# Patient Record
Sex: MALE | Race: BLACK OR AFRICAN AMERICAN | Employment: FULL TIME | URBAN - METROPOLITAN AREA
[De-identification: names, ages, dates, MRNs, and addresses within clinical notes are randomized per-mention and may not be internally consistent; named-entity substitution may affect disease eponyms.]

---

## 2017-01-11 ENCOUNTER — OFFICE VISIT (OUTPATIENT)
Dept: INTERNAL MEDICINE CLINIC | Age: 52
End: 2017-01-11

## 2017-01-11 VITALS
WEIGHT: 187 LBS | DIASTOLIC BLOOD PRESSURE: 78 MMHG | HEART RATE: 70 BPM | BODY MASS INDEX: 26.18 KG/M2 | RESPIRATION RATE: 15 BRPM | TEMPERATURE: 98.3 F | SYSTOLIC BLOOD PRESSURE: 110 MMHG | HEIGHT: 71 IN | OXYGEN SATURATION: 98 %

## 2017-01-11 DIAGNOSIS — F51.01 PRIMARY INSOMNIA: ICD-10-CM

## 2017-01-11 DIAGNOSIS — R05.3 COUGH, PERSISTENT: ICD-10-CM

## 2017-01-11 DIAGNOSIS — J01.00 SUBACUTE MAXILLARY SINUSITIS: Primary | ICD-10-CM

## 2017-01-11 DIAGNOSIS — H61.23 BILATERAL IMPACTED CERUMEN: ICD-10-CM

## 2017-01-11 DIAGNOSIS — F98.8 ADD (ATTENTION DEFICIT DISORDER): ICD-10-CM

## 2017-01-11 DIAGNOSIS — M79.10 MYALGIA: ICD-10-CM

## 2017-01-11 RX ORDER — MOMETASONE FUROATE 50 UG/1
2 SPRAY, METERED NASAL DAILY
Qty: 1 CONTAINER | Refills: 0 | Status: SHIPPED | OUTPATIENT
Start: 2017-01-11 | End: 2017-01-21

## 2017-01-11 RX ORDER — TRAZODONE HYDROCHLORIDE 50 MG/1
50 TABLET ORAL
Qty: 90 TAB | Refills: 0 | Status: SHIPPED | OUTPATIENT
Start: 2017-01-11 | End: 2017-04-11

## 2017-01-11 RX ORDER — DEXTROAMPHETAMINE SACCHARATE, AMPHETAMINE ASPARTATE, DEXTROAMPHETAMINE SULFATE AND AMPHETAMINE SULFATE 5; 5; 5; 5 MG/1; MG/1; MG/1; MG/1
20 TABLET ORAL 2 TIMES DAILY
Qty: 60 TAB | Refills: 0 | Status: SHIPPED | OUTPATIENT
Start: 2017-01-11 | End: 2017-04-25 | Stop reason: SDUPTHER

## 2017-01-11 RX ORDER — CODEINE PHOSPHATE AND GUAIFENESIN 10; 100 MG/5ML; MG/5ML
10 SOLUTION ORAL
Qty: 180 ML | Refills: 0 | Status: SHIPPED | OUTPATIENT
Start: 2017-01-11 | End: 2017-01-16

## 2017-01-11 NOTE — MR AVS SNAPSHOT
Visit Information Date & Time Provider Department Dept. Phone Encounter #  
 1/11/2017  2:45 PM Rey Cortes MD [facility] [phone] [id number] Upcoming Health Maintenance Date Due COLONOSCOPY 7/30/2025 DTaP/Tdap/Td series (2 - Td) 9/28/2026 Allergies as of 1/11/2017  Review Complete On: 1/11/2017 By: Christal Mcdaniel LPN No Known Allergies Current Immunizations  Reviewed on 9/28/2016 Name Date H1N1 FLU VACCINE 4/8/2010 Influenza Vaccine 11/9/2016, 11/10/2014 12:30 PM, 10/21/2013  3:11 PM  
 Influenza Vaccine Intradermal PF 11/4/2015 Influenza Vaccine Split 11/24/2010, 9/28/2009 Tdap 9/28/2016 Not reviewed this visit You Were Diagnosed With   
  
 Codes Comments Subacute maxillary sinusitis    -  Primary ICD-10-CM: J01.00 ICD-9-CM: 461.0 Cough, persistent     ICD-10-CM: R05 ICD-9-CM: 786.2 Bilateral impacted cerumen     ICD-10-CM: H61.23 
ICD-9-CM: 380.4 Myalgia     ICD-10-CM: M79.1 ICD-9-CM: 729.1 ADD (attention deficit disorder)     ICD-10-CM: F98.8 ICD-9-CM: 314.00 Primary insomnia     ICD-10-CM: F51.01 
ICD-9-CM: 307.42 Vitals BP Pulse Temp Resp Height(growth percentile) Weight(growth percentile) 110/78 (BP 1 Location: Right arm, BP Patient Position: Sitting) 70 98.3 °F (36.8 °C) (Oral) 15 5' 11\" (1.803 m) 187 lb (84.8 kg) SpO2 BMI Smoking Status 98% 26.08 kg/m2 Never Smoker BMI and BSA Data Body Mass Index Body Surface Area 26.08 kg/m 2 2.06 m 2 [phone] Your Updated Medication List  
  
   
This list is accurate as of: 1/11/17  4:33 PM.  Always use your most recent med list.  
  
  
  
  
 dextroamphetamine-amphetamine 20 mg tablet Commonly known as:  ADDERALL Take 1 Tab (20 mg total) by mouth two (2) times a day. Max Daily Amount: 40 mg  
  
 guaiFENesin-codeine 100-10 mg/5 mL solution Commonly known as:  ROBITUSSIN AC Take 10 mL by mouth two (2) times daily as needed for Cough for up to 5 days. Max Daily Amount: 20 mL. mometasone 50 mcg/actuation nasal spray Commonly known as:  NASONEX  
2 Sprays by Both Nostrils route daily for 10 days. traZODone 50 mg tablet Commonly known as:  Mick Fragatito Take 1 Tab by mouth nightly for 90 days. Prescriptions Printed Refills  
 mometasone (NASONEX) 50 mcg/actuation nasal spray 0 Si Sprays by Both Nostrils route daily for 10 days. Class: Print Route: Both Nostrils  
 guaiFENesin-codeine (ROBITUSSIN AC) 100-10 mg/5 mL solution 0 Sig: Take 10 mL by mouth two (2) times daily as needed for Cough for up to 5 days. Max Daily Amount: 20 mL. Class: Print Route: Oral  
 dextroamphetamine-amphetamine (ADDERALL) 20 mg tablet 0 Sig: Take 1 Tab (20 mg total) by mouth two (2) times a day. Max Daily Amount: 40 mg  
 Class: Print Route: Oral  
 traZODone (DESYREL) 50 mg tablet 0 Sig: Take 1 Tab by mouth nightly for 90 days. Class: Print Route: Oral  
  
We Performed the Following REMOVE IMPACTED EAR WAX [36384 CPT(R)] Introducing [address]! Dear Luisa Fermin: Thank you for requesting a [facility] account. Our records indicate that you already have an active [facility] account. You can access your account anytime at [email or web address] Did you know that you can access your hospital and ER discharge instructions at any time in [facility]? You can also review all of your test results from your hospital stay or ER visit. Additional Information If you have questions, please visit the Frequently Asked Questions section of the [employer] website at [email or web address] Remember, MyChart is NOT to be used for urgent needs. For medical emergencies, dial 911. Now available from your iPhone and Android! Please provide this summary of care documentation to your next provider. Your primary care clinician is listed as Miguel Ángel Meng. If you have any questions after today's visit, please call [phone].

## 2017-01-11 NOTE — PROGRESS NOTES
Written by Nori Weller, as dictated by Dr. Adam Zhao MD.    Alina Da Silva is a 46 y.o. male. HPI  The patient comes in today C/O a cough x 1 week with a sputum. He has body aches and a headache as well. He has not used any nasal spray. He cannot sleep at night due to the coughing. No fever or chills. He has been having fullness in both ears. He needs a refill of his adderall which I last refilled on 11/09 and he only takes the medication as needed. He also needs a refill of trazodone for sleep. Patient Active Problem List   Diagnosis Code    Attention deficit hyperactivity disorder (ADHD) F90.9    Depression F32.9    Insomnia G47.00    Adjustment disorder with mixed anxiety and depressed mood F43.23    Attention deficit disorder without mention of hyperactivity F98.8    S/P left inguinal hernia repair Z98.890, Z87.19        No current outpatient prescriptions on file prior to visit. No current facility-administered medications on file prior to visit. No Known Allergies    Past Medical History   Diagnosis Date    ADHD 9/28/2009    Depression 9/28/2009    Left inguinal hernia 9/22/2015    S/P left inguinal hernia repair 1/22/2016       Past Surgical History   Procedure Laterality Date    Hx other surgical  2010     QUAD TENDON RE-ATTACHED    Hx mohs procedure  2010    Hx heent  2012 2010     WISDOM TEETH REMOVED    Hx hernia repair Left 10/23/15     Inguinal w/mesh by Dr. Sunil Rabago       Family History   Problem Relation Age of Onset    Cancer Mother      Stomach Cancer diagnosed age 68    Cancer Maternal Grandfather     Hypertension Paternal Grandmother     Glaucoma Paternal Grandfather     Arthritis-osteo Father     Anesth Problems Neg Hx        Social History     Social History    Marital status:      Spouse name: N/A    Number of children: N/A    Years of education: N/A     Occupational History    Not on file. Social History Main Topics    Smoking status: Never Smoker    Smokeless tobacco: Never Used    Alcohol use No    Drug use: No    Sexual activity: Yes     Partners: Female     Other Topics Concern    Not on file     Social History Narrative           Review of Systems   Constitutional: Negative for malaise/fatigue. HENT: Negative for congestion. Respiratory: Positive for cough and sputum production. Negative for wheezing. Cardiovascular: Negative for chest pain and palpitations. Gastrointestinal: Negative for abdominal pain and heartburn. Musculoskeletal: Negative for joint pain and myalgias. Neurological: Positive for headaches. Negative for weakness. Visit Vitals    /78 (BP 1 Location: Right arm, BP Patient Position: Sitting)    Pulse 70    Temp 98.3 °F (36.8 °C) (Oral)    Resp 15    Ht 5' 11\" (1.803 m)    Wt 187 lb (84.8 kg)    SpO2 98%    BMI 26.08 kg/m2     Physical Exam   Constitutional: He is oriented to person, place, and time. He appears well-nourished. No distress. HENT:   Right Ear: External ear normal.   Left Ear: External ear normal.   Mouth/Throat: Oropharynx is clear and moist.   BL cerumen impaction   Maxillary sinus tenderness   Eyes: Conjunctivae and EOM are normal. Right eye exhibits no discharge. Left eye exhibits no discharge. Neck: Normal range of motion. Neck supple. Cardiovascular: Normal rate and regular rhythm. Pulmonary/Chest: Effort normal and breath sounds normal. He has no wheezes. Abdominal: Soft. Bowel sounds are normal. He exhibits no distension. Lymphadenopathy:     He has no cervical adenopathy. Neurological: He is alert and oriented to person, place, and time. Psychiatric: He has a normal mood and affect. Nursing note and vitals reviewed. ASSESSMENT and PLAN    ICD-10-CM ICD-9-CM    1.  Subacute maxillary sinusitis J01.00 461.0 mometasone (NASONEX) 50 mcg/actuation nasal spray script given to patient    I will prescribe him a nasal spray to help dry up his secretions. I am going to write him an abx prescription in case the sxs get worse. 2. Cough, persistent R05 786.2 guaiFENesin-codeine (ROBITUSSIN AC) 100-10 mg/5 mL solution script given to patient     I will prescribe him Robitussin with codeine in order to help his cough so that he can sleep at night       3. Bilateral impacted cerumen H61.23 380.4 REMOVE IMPACTED EAR WAX    Pt tolerated this procedure well. Time out: Immediately prior to procedure a \"time out\" was called to verify the correct patient, procedure, equipment, support staff and site/side marked as required. 4. Myalgia M79.1 729.1 During the day time I suggest that he take 600 mg of ibuprofen with breakfast in order to help with his aches and pains. 5. ADD (attention deficit disorder) F98.8 314.00 dextroamphetamine-amphetamine (ADDERALL) 20 mg tablet script given to patient. I discussed that since I have his testing on file  I will refill his medication as it has been 2 months since his last refill. 6. Primary insomnia F51.01 307.42 traZODone (DESYREL) 50 mg tablet script given to patient. This plan was reviewed with the patient and patient agrees. All questions were answered. This scribe documentation was reviewed by me and accurately reflects the examination and decisions made by me. This note will not be viewable in [address] Kristy Ceja

## 2017-01-11 NOTE — PROGRESS NOTES
Chief Complaint   Patient presents with    Cold Symptoms     with cough with green mucus and this has been lasting for the past two weeks.      Medication Refill     adderall and trazodone

## 2017-04-25 DIAGNOSIS — F98.8 ADD (ATTENTION DEFICIT DISORDER): ICD-10-CM

## 2017-04-26 RX ORDER — DEXTROAMPHETAMINE SACCHARATE, AMPHETAMINE ASPARTATE, DEXTROAMPHETAMINE SULFATE AND AMPHETAMINE SULFATE 5; 5; 5; 5 MG/1; MG/1; MG/1; MG/1
20 TABLET ORAL 2 TIMES DAILY
Qty: 60 TAB | Refills: 0 | Status: SHIPPED | OUTPATIENT
Start: 2017-04-26 | End: 2019-09-17 | Stop reason: ALTCHOICE

## 2019-03-08 ENCOUNTER — OFFICE VISIT (OUTPATIENT)
Dept: PRIMARY CARE CLINIC | Age: 54
End: 2019-03-08

## 2019-03-08 VITALS
OXYGEN SATURATION: 100 % | RESPIRATION RATE: 17 BRPM | DIASTOLIC BLOOD PRESSURE: 73 MMHG | WEIGHT: 193 LBS | HEIGHT: 71 IN | HEART RATE: 63 BPM | BODY MASS INDEX: 27.02 KG/M2 | SYSTOLIC BLOOD PRESSURE: 111 MMHG | TEMPERATURE: 98 F

## 2019-03-08 DIAGNOSIS — M25.512 ACUTE PAIN OF LEFT SHOULDER: Primary | ICD-10-CM

## 2019-03-08 RX ORDER — HYDROCORTISONE 25 MG/G
CREAM TOPICAL
Refills: 2 | COMMUNITY
Start: 2018-12-08 | End: 2019-09-17 | Stop reason: SDUPTHER

## 2019-03-08 RX ORDER — IBUPROFEN 800 MG/1
800 TABLET ORAL
Qty: 30 TAB | Refills: 1 | Status: SHIPPED | OUTPATIENT
Start: 2019-03-08 | End: 2020-05-01 | Stop reason: ALTCHOICE

## 2019-03-08 RX ORDER — IBUPROFEN 800 MG/1
TABLET ORAL
Refills: 3 | COMMUNITY
Start: 2019-02-19 | End: 2019-03-08 | Stop reason: SDUPTHER

## 2019-03-08 RX ORDER — TRETIONIN 0.1 MG/G
GEL TOPICAL
Refills: 12 | COMMUNITY
Start: 2019-01-13 | End: 2019-09-17 | Stop reason: SDUPTHER

## 2019-03-08 RX ORDER — CLINDAMYCIN PHOSPHATE 10 MG/G
GEL TOPICAL
Refills: 12 | COMMUNITY
Start: 2019-02-13 | End: 2019-09-17 | Stop reason: SDUPTHER

## 2019-03-08 NOTE — PROGRESS NOTES
Brijesh Raphael is a 52 y.o.  male and presents with     Chief Complaint   Patient presents with    Shoulder Pain     left shoulder pain x over 2 weeks. States that it came after lifting weights, nothing new.  Letter     wants a letter     Fatigue       Pt has been having pain in his left shoulder for about 2 weeks. He rates the pain at 6/10. Pt  Exercises and lifts weights. He says it hurts when he picks something or lays on his side. Pt has not been taking anything for pain. Pt also feels tired for 2 days. Has scratchy throat. No fever . Mild chills. No SOB, cough abdominal pain, urinary symptoms. Past Medical History:   Diagnosis Date    ADHD 9/28/2009    Depression 9/28/2009    Left inguinal hernia 9/22/2015    S/P left inguinal hernia repair 1/22/2016     Past Surgical History:   Procedure Laterality Date    HX HEENT  2012 2010    WISDOM TEETH REMOVED    HX HERNIA REPAIR Left 10/23/15    Inguinal w/mesh by Dr. Cris Davis Right 12/2017    HX MOHS PROCEDURES  2010    HX OTHER SURGICAL  2010    QUAD TENDON RE-ATTACHED     Current Outpatient Medications   Medication Sig    tretinoin (RETIN-A) 0.01 % topical gel APPLY GEL TO THE FACE FOR ACNE AT BEDTIME    clindamycin (CLINDAGEL) 1 % topical gel APPLY TO ACNE ON FACE DAILY (GENERIC FOR CLINDAGEL)    hydrocortisone (HYTONE) 2.5 % topical cream APPLY TO THE FACE FOR ACNE DAILY    ibuprofen (MOTRIN) 800 mg tablet Take 1 Tab by mouth every eight (8) hours as needed for Pain.  dextroamphetamine-amphetamine (ADDERALL) 20 mg tablet Take 1 Tab (20 mg total) by mouth two (2) times a dayEarliest Fill Date: 4/26/17. Max Daily Amount: 40 mg     No current facility-administered medications for this visit.       Health Maintenance   Topic Date Due    Shingrix Vaccine Age 49> (1 of 2) 07/26/2013    Influenza Age 5 to Adult  08/01/2018    COLONOSCOPY  07/30/2025    DTaP/Tdap/Td series (2 - Td) 09/28/2026    Hepatitis C Screening Completed     Immunization History   Administered Date(s) Administered    H1N1 Influenza Virus Vaccine 04/08/2010    Influenza Vaccine 10/21/2013, 11/10/2014, 11/09/2016, 10/08/2018    Influenza Vaccine Intradermal PF 11/04/2015    Influenza Vaccine Split 09/28/2009, 11/24/2010    Tdap 09/28/2016     No LMP for male patient. Allergies and Intolerances:   No Known Allergies    Family History:   Family History   Problem Relation Age of Onset    Cancer Mother         Stomach Cancer diagnosed age 68    Cancer Maternal Grandfather     Hypertension Paternal Grandmother     Glaucoma Paternal Grandfather     Arthritis-osteo Father     Anesth Problems Neg Hx        Social History:   He  reports that  has never smoked. he has never used smokeless tobacco.  He  reports that he does not drink alcohol.             Review of Systems:   General: negative for - chills, fatigue, fever, weight change  Psych: negative for - anxiety, depression, irritability or mood swings  ENT: negative for - headaches, hearing change, nasal congestion, oral lesions, sneezing or sore throat  Heme/ Lymph: negative for - bleeding problems, bruising, pallor or swollen lymph nodes  Endo: negative for - hot flashes, polydipsia/polyuria or temperature intolerance  Resp: negative for - cough, shortness of breath or wheezing  CV: negative for - chest pain, edema or palpitations  GI: negative for - abdominal pain, change in bowel habits, constipation, diarrhea or nausea/vomiting  : negative for - dysuria, hematuria, incontinence, pelvic pain or vulvar/vaginal symptoms  MSK: negative for - joint pain, joint swelling or muscle pain, pos for left shoulder pain  Neuro: negative for - confusion, headaches, seizures or weakness  Derm: negative for - dry skin, hair changes, rash or skin lesion changes          Physical:   Vitals:   Vitals:    03/08/19 0817   BP: 111/73   Pulse: 63   Resp: 17   Temp: 98 °F (36.7 °C)   TempSrc: Oral   SpO2: 100% Weight: 193 lb (87.5 kg)   Height: 5' 11\" (1.803 m)           Exam:   HEENT- atraumatic,normocephalic, awake, oriented, well nourished, rt ear wax  Neck - supple,no enlarged lymph nodes, no JVD, no thyromegaly  Chest- CTA, no rhonchi, no crackles  Heart- rrr, no murmurs / gallop/rub  Abdomen- soft,BS+,NT, no hepatosplenomegaly  Ext - no c/c/edema ,mild pain elicited on ROM at the left shoulder  Neuro- no focal deficits. Power 5/5 all extremities  Skin - warm,dry, no obvious rashes. Review of Data:   LABS:   Lab Results   Component Value Date/Time    WBC 5.8 09/28/2016 10:28 AM    HGB 13.0 09/28/2016 10:28 AM    HCT 41.1 09/28/2016 10:28 AM    PLATELET 975 28/25/5748 10:28 AM     Lab Results   Component Value Date/Time    Sodium 143 09/28/2016 10:28 AM    Potassium 4.3 09/28/2016 10:28 AM    Chloride 104 09/28/2016 10:28 AM    CO2 26 09/28/2016 10:28 AM    Glucose 84 09/28/2016 10:28 AM    BUN 12 09/28/2016 10:28 AM    Creatinine 1.00 09/28/2016 10:28 AM     Lab Results   Component Value Date/Time    Cholesterol, total 191 09/28/2016 10:28 AM    HDL Cholesterol 50 09/28/2016 10:28 AM    LDL, calculated 110 (H) 09/28/2016 10:28 AM    Triglyceride 155 (H) 09/28/2016 10:28 AM     No results found for: GPT        Impression / Plan:        ICD-10-CM ICD-9-CM    1. Acute pain of left shoulder M25.512 719.41 XR SHOULDER LT AP/LAT MIN 2 V      ibuprofen (MOTRIN) 800 mg tablet     Offered ordering labs , pt wants to wait till he sees PCP. Fatigue - clinically looks fine,declined work up for now    Avoid lifting heavy objects. May need MRI left shoulder if Xrays are unrevealing and pt continues to be in pain. May also need to see Orthopedics. Explained to patient risk benefits of the medications. Advised patient to stop meds if having any side effects. Pt verbalized understanding of the instructions. I have discussed the diagnosis with the patient and the intended plan as seen in the above orders.   The patient has received an after-visit summary and questions were answered concerning future plans. I have discussed medication side effects and warnings with the patient as well. I have reviewed the plan of care with the patient, accepted their input and they are in agreement with the treatment goals. Reviewed plan of care. Patient has provided input and agrees with goals.     Follow-up Disposition: Not on Shira Marrero MD

## 2019-03-08 NOTE — LETTER
3/8/2019 8:43 AM 
 
Mr. Zeinab Villarreal [address] [facility] [id number] To Whom It May Concern: 
 
Zeinab Villarreal is currently under the care of Will Hamilton. This is to state that the above patient has left shoulder  injury that occurred  doing exercise that involved lifting weights. If there are questions or concerns please have the patient contact our office.  
 
 
 
Sincerely, 
 
 
Purnima Polanco MD

## 2019-04-17 ENCOUNTER — OFFICE VISIT (OUTPATIENT)
Dept: PRIMARY CARE CLINIC | Age: 54
End: 2019-04-17

## 2019-04-17 VITALS
RESPIRATION RATE: 16 BRPM | TEMPERATURE: 97.8 F | DIASTOLIC BLOOD PRESSURE: 74 MMHG | BODY MASS INDEX: 25.76 KG/M2 | WEIGHT: 184 LBS | OXYGEN SATURATION: 100 % | HEIGHT: 71 IN | HEART RATE: 60 BPM | SYSTOLIC BLOOD PRESSURE: 111 MMHG

## 2019-04-17 DIAGNOSIS — Z00.00 PHYSICAL EXAM: Primary | ICD-10-CM

## 2019-04-17 DIAGNOSIS — G89.29 CHRONIC LEFT SHOULDER PAIN: ICD-10-CM

## 2019-04-17 DIAGNOSIS — F90.2 ATTENTION DEFICIT HYPERACTIVITY DISORDER (ADHD), COMBINED TYPE: ICD-10-CM

## 2019-04-17 DIAGNOSIS — M25.512 CHRONIC LEFT SHOULDER PAIN: ICD-10-CM

## 2019-04-17 DIAGNOSIS — F51.01 PRIMARY INSOMNIA: ICD-10-CM

## 2019-04-17 RX ORDER — CLINDAMYCIN PHOSPHATE 10 MG/G
GEL TOPICAL
Qty: 1 ML | Refills: 12 | Status: CANCELLED | OUTPATIENT
Start: 2019-04-17

## 2019-04-17 RX ORDER — IBUPROFEN 800 MG/1
800 TABLET ORAL
Qty: 30 TAB | Refills: 1 | Status: CANCELLED | OUTPATIENT
Start: 2019-04-17

## 2019-04-17 RX ORDER — HYDROCORTISONE 25 MG/G
CREAM TOPICAL
Qty: 30 G | Refills: 2 | Status: CANCELLED | OUTPATIENT
Start: 2019-04-17

## 2019-04-17 RX ORDER — PREDNISONE 20 MG/1
TABLET ORAL
Qty: 12 TAB | Refills: 0 | Status: SHIPPED | OUTPATIENT
Start: 2019-04-17 | End: 2019-05-08 | Stop reason: SDUPTHER

## 2019-04-17 RX ORDER — DOXEPIN HYDROCHLORIDE 25 MG/1
25 CAPSULE ORAL
Qty: 30 CAP | Refills: 0 | Status: SHIPPED | OUTPATIENT
Start: 2019-04-17 | End: 2019-04-24 | Stop reason: SINTOL

## 2019-04-17 RX ORDER — TRETIONIN 0.1 MG/G
GEL TOPICAL
Qty: 15 G | Refills: 12 | Status: CANCELLED | OUTPATIENT
Start: 2019-04-17

## 2019-04-17 RX ORDER — DEXTROAMPHETAMINE SACCHARATE, AMPHETAMINE ASPARTATE, DEXTROAMPHETAMINE SULFATE AND AMPHETAMINE SULFATE 5; 5; 5; 5 MG/1; MG/1; MG/1; MG/1
20 TABLET ORAL 2 TIMES DAILY
Qty: 60 TAB | Refills: 0 | Status: CANCELLED | OUTPATIENT
Start: 2019-04-17

## 2019-04-17 RX ORDER — DEXTROAMPHETAMINE SACCHARATE, AMPHETAMINE ASPARTATE, DEXTROAMPHETAMINE SULFATE AND AMPHETAMINE SULFATE 5; 5; 5; 5 MG/1; MG/1; MG/1; MG/1
20 TABLET ORAL 2 TIMES DAILY
Qty: 60 TAB | Refills: 0 | Status: SHIPPED | OUTPATIENT
Start: 2019-04-17 | End: 2019-05-17

## 2019-04-17 NOTE — PROGRESS NOTES
Chief Complaint   Patient presents with   Reiseñor 3 and would like a physical and is fasting.  states would like a referral for ortho as his shoulder is still bothering.   patient returning to practice since he has insurance we accceptl  would like trazodone for sleep

## 2019-04-17 NOTE — PROGRESS NOTES
Written by María Smith, as dictated by Dr. Ji Brannon MD.    Arlen Winkler is a 52 y.o. male. HPI  The patient presents today to re-establish care and requesting a complete physical with fasting labs. Denies snoring, chest tightness, SOB, heartburn, constipation, urinary symptoms. He has been having L shoulder pain, for which he was seen by Dr. Alvina Vogel (). Sometimes his pain is 5 or 6/10. Patient was given an order for an XR, but pt did not have it done. His L shoulder is painful with lifting and moving, and he notes that the pain started about 1.5 months ago. Pt took ibuprofen, applied ice, and favored the shoulder without significant improvement. He notes that he had L rotator cuff repair in 2010. The patient has not been to PT but would like a referral to ortho. Pt notes that he has been taking Tylenol PM to help him fall asleep, but he still wakes up during the night. He has tried Trazodone, but would like to try another medication. The pt has not tried OTC melatonin. Patient believes his insomnia is due to thinking about the day. Today he weighs 184 lbs and has lost weight from 193 lbs on 03/08/2019. Patient reports that he has been exercising and watching his diet. He has been lifting, but does not do cardio exercise. The patient has been taking 1 tab Adderall 20 mg BID and doing well. Testing is on file. He requests a refill today. Patient notes that since he has last seen me he had L inguinal hernia repair at [facility]. The pt received a flu shot in 10/2018. He believes he is due for a colonoscopy this year.     Patient Active Problem List   Diagnosis Code    Attention deficit hyperactivity disorder (ADHD) F90.9    Depression F32.9    Insomnia G47.00    Adjustment disorder with mixed anxiety and depressed mood F43.23    Attention deficit disorder without mention of hyperactivity F98.8    S/P left inguinal hernia repair Z98.890, Z87.19        Current Outpatient Medications on File Prior to Visit   Medication Sig Dispense Refill    tretinoin (RETIN-A) 0.01 % topical gel APPLY GEL TO THE FACE FOR ACNE AT BEDTIME  12    clindamycin (CLINDAGEL) 1 % topical gel APPLY TO ACNE ON FACE DAILY (GENERIC FOR CLINDAGEL)  12    hydrocortisone (HYTONE) 2.5 % topical cream APPLY TO THE FACE FOR ACNE DAILY  2    ibuprofen (MOTRIN) 800 mg tablet Take 1 Tab by mouth every eight (8) hours as needed for Pain. 30 Tab 1    dextroamphetamine-amphetamine (ADDERALL) 20 mg tablet Take 1 Tab (20 mg total) by mouth two (2) times a dayEarliest Fill Date: 4/26/17. Max Daily Amount: 40 mg 60 Tab 0     No current facility-administered medications on file prior to visit.         Past Medical History:   Diagnosis Date    ADHD 9/28/2009    Depression 9/28/2009    Left inguinal hernia 9/22/2015    S/P left inguinal hernia repair 1/22/2016       Past Surgical History:   Procedure Laterality Date    HX HEENT  2012 2010    WISDOM TEETH REMOVED    HX HERNIA REPAIR Left 10/23/15    Inguinal w/mesh by Dr. Yaima Taylor Right 12/2017    HX MOHS PROCEDURES  2010    HX OTHER SURGICAL  2010    QUAD TENDON RE-ATTACHED       Family History   Problem Relation Age of Onset    Cancer Mother         Stomach Cancer diagnosed age 68    Cancer Maternal Grandfather     Hypertension Paternal Grandmother     Glaucoma Paternal Grandfather     Arthritis-osteo Father     Anesth Problems Neg Hx        Social History     Socioeconomic History    Marital status:      Spouse name: Not on file    Number of children: Not on file    Years of education: Not on file    Highest education level: Not on file   Occupational History    Not on file   Social Needs    Financial resource strain: Not on file    Food insecurity:     Worry: Not on file     Inability: Not on file    Transportation needs:     Medical: Not on file     Non-medical: Not on file   Tobacco Use    Smoking status: Never Smoker    Smokeless tobacco: Never Used   Substance and Sexual Activity    Alcohol use: No    Drug use: No    Sexual activity: Yes     Partners: Female   Lifestyle    Physical activity:     Days per week: Not on file     Minutes per session: Not on file    Stress: Not on file   Relationships    Social connections:     Talks on phone: Not on file     Gets together: Not on file     Attends [employer] service: Not on file     Active member of club or organization: Not on file     Attends meetings of clubs or organizations: Not on file     Relationship status: Not on file    Intimate partner violence:     Fear of current or ex partner: Not on file     Emotionally abused: Not on file     Physically abused: Not on file     Forced sexual activity: Not on file   Other Topics Concern    Not on file   Social History Narrative    Not on file       Review of Systems   Constitutional: Positive for weight loss (intentional). Negative for malaise/fatigue. HENT: Negative for congestion. Eyes: Negative for blurred vision and pain. Respiratory: Negative for cough and shortness of breath. Cardiovascular: Negative for chest pain and palpitations. Gastrointestinal: Negative for abdominal pain, constipation and heartburn. Genitourinary: Negative for frequency and urgency. Musculoskeletal: Positive for joint pain (L shoulder). Negative for myalgias. Neurological: Negative for dizziness, tingling, sensory change, weakness and headaches. Psychiatric/Behavioral: Negative for depression, memory loss and substance abuse. The patient has insomnia. Visit Vitals  /74 (BP 1 Location: Right arm, BP Patient Position: Sitting)   Pulse 60   Temp 97.8 °F (36.6 °C) (Oral)   Resp 16   Ht 5' 11\" (1.803 m)   Wt 184 lb (83.5 kg)   SpO2 100%   BMI 25.66 kg/m²       Physical Exam   Constitutional: He is oriented to person, place, and time. He appears well-developed and well-nourished.  No distress. HENT:   Right Ear: Tympanic membrane, external ear and ear canal normal.   Left Ear: Tympanic membrane, external ear and ear canal normal.   Mouth/Throat: Oropharynx is clear and moist.   L cerumen present   Eyes: Conjunctivae and EOM are normal. Right eye exhibits no discharge. Left eye exhibits no discharge. Neck: Normal range of motion. Neck supple. No thyromegaly present. Cardiovascular: Normal rate, regular rhythm and normal heart sounds. Pulses:       Dorsalis pedis pulses are 2+ on the right side, and 2+ on the left side. Pulmonary/Chest: Effort normal and breath sounds normal. He has no wheezes. Abdominal: Soft. Bowel sounds are normal. There is no tenderness. Musculoskeletal: He exhibits no edema. BL knee negative for crepitus   Lymphadenopathy:     He has no cervical adenopathy. Neurological: He is alert and oriented to person, place, and time. Reflex Scores:       Patellar reflexes are 2+ on the right side and 2+ on the left side. R shoulder ROM normal, scarf sign negative  L shoulder ROM normal but painful, scarf sign negative   Skin: He is not diaphoretic. Psychiatric: He has a normal mood and affect. His behavior is normal.   Nursing note and vitals reviewed. ASSESSMENT and PLAN    ICD-10-CM ICD-9-CM    1. Physical exam Y67.38 P05.0 METABOLIC PANEL, COMPREHENSIVE      CBC W/O DIFF      LIPID PANEL      TSH 3RD GENERATION      PSA, DIAGNOSTIC (PROSTATE SPECIFIC AG)    Complete physical exam done. Basic fasting labs drawn. 2. Chronic left shoulder pain M25.512 719.41 predniSONE (DELTASONE) 20 mg tablet sent to pharmacy. G89.29 338.29 REFERRAL TO PHYSICAL THERAPY    Prednisone 20 mg prescribed. Potential side effects were discussed. I want the patient to take the medication po as follows: 3 pills x 2 days, 2 pills x 2 days, 1 pill x 2 days and 1/2 pill x 1 day. The patient was advised to take this medication with food. Referred to PT.  If he does not do well with PT, I will order an XR. 3. Attention deficit hyperactivity disorder (ADHD), combined type F90.2 314.01 dextroamphetamine-amphetamine (ADDERALL) 20 mg tablet script given to patient. Adderall 20 mg prescribed. Potential side effects were discussed. He should take 1 tab PO BID. Discussed that Adderall and Doxepin interact, so he should make sure to to take Adderall in the evening when taking Doxepin. 4. Primary insomnia F51.01 307.42 doxepin (SINEQUAN) 25 mg capsule sent to pharmacy. Doxepin 25 mg prescribed. Potential side effects were discussed. He should take 1 tab PO in the evening. Advised pt to try OTC melatonin before taking doxepin. This plan was reviewed with the patient and patient agrees. All questions were answered. This scribe documentation was reviewed by me and accurately reflects the examination and decisions made by me. This note will not be viewable in [address]

## 2019-04-18 LAB
ALBUMIN SERPL-MCNC: 4.3 G/DL (ref 3.5–5.5)
ALBUMIN/GLOB SERPL: 1.7 {RATIO} (ref 1.2–2.2)
ALP SERPL-CCNC: 63 IU/L (ref 39–117)
ALT SERPL-CCNC: 17 IU/L (ref 0–44)
AST SERPL-CCNC: 21 IU/L (ref 0–40)
BILIRUB SERPL-MCNC: 0.3 MG/DL (ref 0–1.2)
BUN SERPL-MCNC: 10 MG/DL (ref 6–24)
BUN/CREAT SERPL: 10 (ref 9–20)
CALCIUM SERPL-MCNC: 10.2 MG/DL (ref 8.7–10.2)
CHLORIDE SERPL-SCNC: 105 MMOL/L (ref 96–106)
CHOLEST SERPL-MCNC: 142 MG/DL (ref 100–199)
CO2 SERPL-SCNC: 25 MMOL/L (ref 20–29)
CREAT SERPL-MCNC: 0.98 MG/DL (ref 0.76–1.27)
ERYTHROCYTE [DISTWIDTH] IN BLOOD BY AUTOMATED COUNT: 17.1 % (ref 12.3–15.4)
GLOBULIN SER CALC-MCNC: 2.5 G/DL (ref 1.5–4.5)
GLUCOSE SERPL-MCNC: 86 MG/DL (ref 65–99)
HCT VFR BLD AUTO: 42.6 % (ref 37.5–51)
HDLC SERPL-MCNC: 41 MG/DL
HGB BLD-MCNC: 13.4 G/DL (ref 13–17.7)
LDLC SERPL CALC-MCNC: 73 MG/DL (ref 0–99)
MCH RBC QN AUTO: 22.6 PG (ref 26.6–33)
MCHC RBC AUTO-ENTMCNC: 31.5 G/DL (ref 31.5–35.7)
MCV RBC AUTO: 72 FL (ref 79–97)
PLATELET # BLD AUTO: 192 X10E3/UL (ref 150–379)
POTASSIUM SERPL-SCNC: 4.5 MMOL/L (ref 3.5–5.2)
PROT SERPL-MCNC: 6.8 G/DL (ref 6–8.5)
PSA SERPL-MCNC: 1.3 NG/ML (ref 0–4)
RBC # BLD AUTO: 5.92 X10E6/UL (ref 4.14–5.8)
SODIUM SERPL-SCNC: 142 MMOL/L (ref 134–144)
TRIGL SERPL-MCNC: 140 MG/DL (ref 0–149)
TSH SERPL DL<=0.005 MIU/L-ACNC: 0.98 UIU/ML (ref 0.45–4.5)
VLDLC SERPL CALC-MCNC: 28 MG/DL (ref 5–40)
WBC # BLD AUTO: 4.7 X10E3/UL (ref 3.4–10.8)

## 2019-04-19 NOTE — PROGRESS NOTES
Ricky Perdomo, your blood work came back fine. Cholesterol numbers look great. Keep up the good work!

## 2019-04-23 DIAGNOSIS — Z01.89 ENCOUNTER FOR BLOOD TEST: Primary | ICD-10-CM

## 2019-04-24 ENCOUNTER — TELEPHONE (OUTPATIENT)
Dept: PRIMARY CARE CLINIC | Age: 54
End: 2019-04-24

## 2019-04-24 DIAGNOSIS — F51.01 PRIMARY INSOMNIA: Primary | ICD-10-CM

## 2019-04-24 RX ORDER — TRAZODONE HYDROCHLORIDE 50 MG/1
50 TABLET ORAL
Qty: 30 TAB | Refills: 0 | Status: SHIPPED | OUTPATIENT
Start: 2019-04-24 | End: 2019-06-21 | Stop reason: SDUPTHER

## 2019-04-24 NOTE — TELEPHONE ENCOUNTER
Doxepin is too strong and would like a lower dosage or if trazodone could be sent instead.   Would like a call back from a nurse

## 2019-05-08 DIAGNOSIS — G89.29 CHRONIC LEFT SHOULDER PAIN: ICD-10-CM

## 2019-05-08 DIAGNOSIS — M25.512 CHRONIC LEFT SHOULDER PAIN: ICD-10-CM

## 2019-05-08 RX ORDER — PREDNISONE 20 MG/1
TABLET ORAL
Qty: 12 TAB | Refills: 0 | Status: SHIPPED | OUTPATIENT
Start: 2019-05-08 | End: 2019-07-24 | Stop reason: ALTCHOICE

## 2019-05-08 NOTE — TELEPHONE ENCOUNTER
Called and spoke with patient and he states that he completed the last therapy and his shoulder is feeling much improved and he feels if he could have one more round he would be in a much better place with his shoulder pain. End of encounter.

## 2019-06-21 DIAGNOSIS — F51.01 PRIMARY INSOMNIA: ICD-10-CM

## 2019-06-21 RX ORDER — TRAZODONE HYDROCHLORIDE 50 MG/1
50 TABLET ORAL
Qty: 30 TAB | Refills: 0 | Status: SHIPPED | OUTPATIENT
Start: 2019-06-21 | End: 2019-07-21

## 2019-06-21 NOTE — TELEPHONE ENCOUNTER
Pharmacy called regarding med refill order trazadome 50 mg contact with any questions.  Med not listed in chart Brigette @ [phone]

## 2019-07-24 ENCOUNTER — OFFICE VISIT (OUTPATIENT)
Dept: PRIMARY CARE CLINIC | Age: 54
End: 2019-07-24

## 2019-07-24 ENCOUNTER — TELEPHONE (OUTPATIENT)
Dept: PRIMARY CARE CLINIC | Age: 54
End: 2019-07-24

## 2019-07-24 VITALS
HEART RATE: 66 BPM | SYSTOLIC BLOOD PRESSURE: 112 MMHG | BODY MASS INDEX: 25.66 KG/M2 | TEMPERATURE: 98 F | OXYGEN SATURATION: 98 % | HEIGHT: 71 IN | RESPIRATION RATE: 16 BRPM | DIASTOLIC BLOOD PRESSURE: 71 MMHG

## 2019-07-24 DIAGNOSIS — F90.2 ATTENTION DEFICIT HYPERACTIVITY DISORDER (ADHD), COMBINED TYPE: ICD-10-CM

## 2019-07-24 DIAGNOSIS — S76.112D RUPTURE OF LEFT QUADRICEPS TENDON, SUBSEQUENT ENCOUNTER: ICD-10-CM

## 2019-07-24 DIAGNOSIS — Z01.818 PRE-OP EXAM: ICD-10-CM

## 2019-07-24 DIAGNOSIS — M25.562 ACUTE PAIN OF BOTH KNEES: Primary | ICD-10-CM

## 2019-07-24 DIAGNOSIS — M25.561 ACUTE PAIN OF BOTH KNEES: Primary | ICD-10-CM

## 2019-07-24 NOTE — PROGRESS NOTES
Visit Vitals  /71 (BP 1 Location: Left arm, BP Patient Position: Sitting)   Pulse 66   Temp 98 °F (36.7 °C) (Oral)   Resp 16   Ht 5' 11\" (1.803 m)   SpO2 98%   BMI 25.66 kg/m²             Chief Complaint   Patient presents with   Adolph Harding MD DOS: 07/26/2019 Bilateral quadriceps tendon repair            Patient had a recent Bilateral quadriceps tear, during a game of volleyball. [facility] injury occurred on 07/16/2019, initially treated at [facility] in [place], [place]. Presents today for a Pre-Op Exam scheduled for repair on Friday, 07/26/2019.            1. Have you been to the ER, urgent care clinic since your last visit? Hospitalized since your last visit? [facility] injury occurred on 07/16/2019, initially treated at [facility] in [place], [place]. Presents today for a Pre-Op Exam scheduled for repair on Friday, 07/26/2019.        2. Have you seen or consulted any other health care providers outside of the [address] [place] [address] since your last visit? Include any pap smears or colon screening. States injury occurred on 07/16/2019, initially treated at [facility] in [place], [place]. Presents today for a Pre-Op Exam scheduled for repair on Friday, 07/26/2019.     Dr. Fry Ask

## 2019-07-24 NOTE — PROGRESS NOTES
Preoperative Evaluation    Date of Exam: 2019    Chilango Fernandez is a 52 y.o. male (:1965) who presents for preoperative evaluation. Injured his knee while playing Volley ball. Latex Allergy: no    Problem List:     Patient Active Problem List    Diagnosis Date Noted    S/P left inguinal hernia repair 2016    Adjustment disorder with mixed anxiety and depressed mood 2014    Attention deficit disorder without mention of hyperactivity 2014    Attention deficit hyperactivity disorder (ADHD) 2009    Insomnia 2009     Medical History:     Past Medical History:   Diagnosis Date    ADHD 2009    Depression 2009    Left inguinal hernia 2015    S/P left inguinal hernia repair 2016     Allergies:   No Known Allergies   Medications:     Current Outpatient Medications   Medication Sig    tretinoin (RETIN-A) 0.01 % topical gel APPLY GEL TO THE FACE FOR ACNE AT BEDTIME    clindamycin (CLINDAGEL) 1 % topical gel APPLY TO ACNE ON FACE DAILY (GENERIC FOR CLINDAGEL)    hydrocortisone (HYTONE) 2.5 % topical cream APPLY TO THE FACE FOR ACNE DAILY    ibuprofen (MOTRIN) 800 mg tablet Take 1 Tab by mouth every eight (8) hours as needed for Pain.  dextroamphetamine-amphetamine (ADDERALL) 20 mg tablet Take 1 Tab (20 mg total) by mouth two (2) times a dayEarliest Fill Date: 17. Max Daily Amount: 40 mg     No current facility-administered medications for this visit.       Surgical History:     Past Surgical History:   Procedure Laterality Date    HX HEENT  2012    WISDOM TEETH REMOVED    HX HERNIA REPAIR Left 10/23/15    Inguinal w/mesh by Dr. Gill Hunt Right 2017    HX MOHS PROCEDURES  2010    HX OTHER SURGICAL      QUAD TENDON RE-ATTACHED     Social History:     Social History     Socioeconomic History    Marital status:      Spouse name: Not on file    Number of children: Not on file    Years of education: Not on file    Highest education level: Not on file   Tobacco Use    Smoking status: Never Smoker    Smokeless tobacco: Never Used   Substance and Sexual Activity    Alcohol use: No    Drug use: No    Sexual activity: Yes     Partners: Female       Anesthesia Complications: None  History of abnormal bleeding : None  History of Blood Transfusions: no  Health Care Directive or Living Will: yes    Objective:     ROS:   No dyspnea or chest pain on exertion. No abdominal pain, change in bowel habits, black or bloody stools. No urinary tract or prostatic symptoms. No neurological complaints. OBJECTIVE:   The patient appears well, alert, oriented x 3, in no distress. Visit Vitals  /71 (BP 1 Location: Left arm, BP Patient Position: Sitting)   Pulse 66   Temp 98 °F (36.7 °C) (Oral)   Resp 16   Ht 5' 11\" (1.803 m)   SpO2 98%   BMI 25.66 kg/m²     ENT normal.  Neck supple. No adenopathy or thyromegaly. [place]. Lungs are clear, good air entry, no wheezes, rhonchi or rales. Cardiovascular:S1 and S2 normal, no murmurs, regular rate and rhythm. Abdomen is soft without tenderness, guarding, mass or organomegaly.  exam: no penile lesions or discharge, no testicular masses or tenderness, no hernias. Extremities show no edema, normal peripheral pulses. Neurological is normal without focal findings. DIAGNOSTICS:   CBC & CMP normal in 04/19. IMPRESSION:   Diagnoses and all orders for this visit:    Acute pain of both knees  Injured both knees while playing [employer]. On Tramadol & I buprofen for pain    Rupture of left quadriceps tendon, subsequent encounter  Scheduled for quadriceps repair on  07/26. Pre-op exam  Cleared for the procedure. No NSAIDS or ASA 3 days before the surgery. Attention deficit hyperactivity disorder (ADHD), combined type  Not taking Adderall at this time. Told him hod off until the procedure is done.          Giovana Downey MD   7/24/2019

## 2019-09-17 ENCOUNTER — OFFICE VISIT (OUTPATIENT)
Dept: PRIMARY CARE CLINIC | Age: 54
End: 2019-09-17

## 2019-09-17 VITALS
HEART RATE: 62 BPM | OXYGEN SATURATION: 99 % | WEIGHT: 180 LBS | HEIGHT: 71 IN | BODY MASS INDEX: 25.2 KG/M2 | TEMPERATURE: 98.2 F | RESPIRATION RATE: 17 BRPM | DIASTOLIC BLOOD PRESSURE: 77 MMHG | SYSTOLIC BLOOD PRESSURE: 115 MMHG

## 2019-09-17 DIAGNOSIS — R21 RASH AND NONSPECIFIC SKIN ERUPTION: ICD-10-CM

## 2019-09-17 DIAGNOSIS — L70.5 EXCORIATED ACNE: ICD-10-CM

## 2019-09-17 DIAGNOSIS — Z98.890 S/P KNEE SURGERY: ICD-10-CM

## 2019-09-17 DIAGNOSIS — F98.8 ADD (ATTENTION DEFICIT DISORDER): Primary | ICD-10-CM

## 2019-09-17 RX ORDER — CLINDAMYCIN PHOSPHATE 10 MG/G
GEL TOPICAL
Qty: 1 ML | Refills: 12 | Status: CANCELLED | OUTPATIENT
Start: 2019-09-17

## 2019-09-17 RX ORDER — DEXTROAMPHETAMINE SACCHARATE, AMPHETAMINE ASPARTATE, DEXTROAMPHETAMINE SULFATE AND AMPHETAMINE SULFATE 5; 5; 5; 5 MG/1; MG/1; MG/1; MG/1
20 TABLET ORAL 2 TIMES DAILY
Qty: 60 TAB | Refills: 0 | Status: SHIPPED | OUTPATIENT
Start: 2019-09-17 | End: 2019-10-17

## 2019-09-17 RX ORDER — HYDROCORTISONE 25 MG/G
CREAM TOPICAL 2 TIMES DAILY
Qty: 30 G | Refills: 2 | Status: SHIPPED | OUTPATIENT
Start: 2019-09-17 | End: 2019-10-02

## 2019-09-17 RX ORDER — TRETIONIN 0.1 MG/G
GEL TOPICAL
Qty: 45 G | Refills: 1 | Status: SHIPPED | OUTPATIENT
Start: 2019-09-17 | End: 2019-10-02 | Stop reason: SDUPTHER

## 2019-09-17 RX ORDER — HYDROCORTISONE 25 MG/G
CREAM TOPICAL
Qty: 30 G | Refills: 2 | Status: CANCELLED | OUTPATIENT
Start: 2019-09-17

## 2019-09-17 RX ORDER — DEXTROAMPHETAMINE SACCHARATE, AMPHETAMINE ASPARTATE, DEXTROAMPHETAMINE SULFATE AND AMPHETAMINE SULFATE 5; 5; 5; 5 MG/1; MG/1; MG/1; MG/1
20 TABLET ORAL 2 TIMES DAILY
Qty: 60 TAB | Refills: 0 | Status: CANCELLED | OUTPATIENT
Start: 2019-09-17

## 2019-09-17 RX ORDER — TRETIONIN 0.1 MG/G
GEL TOPICAL
Qty: 15 G | Refills: 12 | Status: CANCELLED | OUTPATIENT
Start: 2019-09-17

## 2019-09-17 RX ORDER — CLINDAMYCIN PHOSPHATE 10 MG/G
GEL TOPICAL 2 TIMES DAILY
Qty: 60 G | Refills: 0 | Status: SHIPPED | OUTPATIENT
Start: 2019-09-17 | End: 2019-10-17

## 2019-09-17 NOTE — PROGRESS NOTES
Written by Pawel Grimaldo, as dictated by Dr. Marcelino Bonilla MD.    Mason Sanabria is a 62 y.o. male. HPI  The patient presents today for follow-up. Patient is wearing leg braces bilaterally. MRI showed that on both legs, his quadricept tendon was torn. He was told his R was almost completely severed, and decided that it would be best to do surgery on both legs. He has been walking well now, but it has been an ordeal. He has been going through [address] [place] [address] for 10 visits, and preferred to do some exercises on his own. He plans to go to the gym next week. He was told that it would take 6 months before he is fully back to normal, and he will start walking on his own in some weeks. His pain has been increasing with increased ROM, and walking, and notes that it is uncomfortably tight. He also reports some back pain with long walks. Denies fever. His constipation has improved when he stopped taking Oxycodone. He has been taking Tylenol for the pain. He has follow-up appointments with Dr. Ruth Britt (Orthopedic Surgery), and his next appointment is 10/04/19. He would like a refill of his Clindamycin 1% topical cream, tretinoin 0.01% topical gel, and hydrocortisone 2.5% topical cream.    He will be returning to work in 2 weeks from an 8 week leave, and would like a refill of Adderall 20 mg BID. His ADD testing was done by Dr. Arthur Sepulveda in 2014.        Patient Active Problem List   Diagnosis Code    Attention deficit hyperactivity disorder (ADHD) F90.9    Insomnia G47.00    Adjustment disorder with mixed anxiety and depressed mood F43.23    Attention deficit disorder without mention of hyperactivity F98.8    S/P left inguinal hernia repair Z98.890, Z87.19        Current Outpatient Medications on File Prior to Visit   Medication Sig Dispense Refill    tretinoin (RETIN-A) 0.01 % topical gel APPLY GEL TO THE FACE FOR ACNE AT BEDTIME  12    clindamycin (CLINDAGEL) 1 % topical gel APPLY TO ACNE ON FACE DAILY (GENERIC FOR CLINDAGEL)  12    hydrocortisone (HYTONE) 2.5 % topical cream APPLY TO THE FACE FOR ACNE DAILY  2    ibuprofen (MOTRIN) 800 mg tablet Take 1 Tab by mouth every eight (8) hours as needed for Pain. 30 Tab 1    dextroamphetamine-amphetamine (ADDERALL) 20 mg tablet Take 1 Tab (20 mg total) by mouth two (2) times a dayEarliest Fill Date: 4/26/17. Max Daily Amount: 40 mg 60 Tab 0     No current facility-administered medications on file prior to visit.         Past Medical History:   Diagnosis Date    ADHD 9/28/2009    Depression 9/28/2009    Left inguinal hernia 9/22/2015    S/P left inguinal hernia repair 1/22/2016       Past Surgical History:   Procedure Laterality Date    HX HEENT  2012 2010    WISDOM TEETH REMOVED    HX HERNIA REPAIR Left 10/23/15    Inguinal w/mesh by Dr. Quique Burns Right 12/2017    HX MOHS PROCEDURES  2010    HX OTHER SURGICAL  2010    QUAD TENDON RE-ATTACHED    HX OTHER SURGICAL Bilateral 07/26/2019    QUAD TENDON RE-ATTACHED       Family History   Problem Relation Age of Onset    Cancer Mother         Stomach Cancer diagnosed age 68    Cancer Maternal Grandfather     Hypertension Paternal Grandmother     Glaucoma Paternal Grandfather     Arthritis-osteo Father     Anesth Problems Neg Hx        Social History     Socioeconomic History    Marital status:      Spouse name: Not on file    Number of children: Not on file    Years of education: Not on file    Highest education level: Not on file   Occupational History    Not on file   Social Needs    Financial resource strain: Not on file    Food insecurity:     Worry: Not on file     Inability: Not on file    Transportation needs:     Medical: Not on file     Non-medical: Not on file   Tobacco Use    Smoking status: Never Smoker    Smokeless tobacco: Never Used   Substance and Sexual Activity    Alcohol use: No    Drug use: No    Sexual activity: Yes     Partners: Female   Lifestyle    Physical activity:     Days per week: Not on file     Minutes per session: Not on file    Stress: Not on file   Relationships    Social connections:     Talks on phone: Not on file     Gets together: Not on file     Attends [employer] service: Not on file     Active member of club or organization: Not on file     Attends meetings of clubs or organizations: Not on file     Relationship status: Not on file    Intimate partner violence:     Fear of current or ex partner: Not on file     Emotionally abused: Not on file     Physically abused: Not on file     Forced sexual activity: Not on file   Other Topics Concern    Not on file   Social History Narrative    Not on file       Review of Systems   Respiratory: Negative for shortness of breath. Musculoskeletal: Positive for back pain and myalgias (managed with Tylenol, hydrocortisone cream). Negative for joint pain. Skin: Positive for rash (intermittent). + acne   Neurological: Negative for dizziness and headaches. Psychiatric/Behavioral: Negative for depression and substance abuse. The patient is not nervous/anxious and does not have insomnia. Visit Vitals  /77 (BP 1 Location: Left arm, BP Patient Position: Sitting)   Pulse 62   Temp 98.2 °F (36.8 °C) (Oral)   Resp 17   Ht 5' 11\" (1.803 m)   Wt 180 lb (81.6 kg) Comment: patient stated   SpO2 99%   BMI 25.10 kg/m²       Physical Exam   Constitutional: He is oriented to person, place, and time. He appears well-developed and well-nourished. No distress. HENT:   Head: Normocephalic and atraumatic. Right Ear: External ear normal.   Left Ear: External ear normal.   Eyes: Pupils are equal, round, and reactive to light. Conjunctivae and EOM are normal. Right eye exhibits no discharge. Left eye exhibits no discharge. Neck: Normal range of motion. Neck supple. Cardiovascular: Normal rate, regular rhythm and normal heart sounds.    No murmur heard.  Pulmonary/Chest: Effort normal and breath sounds normal. He has no wheezes. Abdominal: Soft. Bowel sounds are normal.   Musculoskeletal: Normal range of motion. Neurological: He is alert and oriented to person, place, and time. He has normal reflexes. Reflex Scores:       Patellar reflexes are 2+ on the right side and 2+ on the left side. Skin:   Pustules on the forehead & lower cheek. Dryness on elbow creases. Psychiatric: He has a normal mood and affect. His behavior is normal. Thought content normal.   Nursing note and vitals reviewed. ASSESSMENT and PLAN    ICD-10-CM ICD-9-CM    1. ADD (attention deficit disorder) F98.8 314.00 dextroamphetamine-amphetamine (ADDERALL) 20 mg tablet script given to pt. Adderall 20 mg prescribed. Potential side effects were discussed. Pt should take 1 tab BID. 2. S/P knee surgery Z98.890 V45.89 Patient is recovering well. Followed by Orthopedic Surgery. 3. Excoriated acne L70.5 706.1 clindamycin (CLINDAGEL) 1 % topical gel sent to pharmacy. Clindamycine 1 % topical gel refilled. 4. Rash and nonspecific skin eruption R21 782.1 tretinoin (RETIN-A) 0.01 % topical gel sent to pharmacy. hydrocortisone (HYTONE) 2.5 % topical cream sent to pharmacy. Tretinoin 0.01% topical gel and Hydrocortisone 2.5% topical cream refilled as well. This plan was reviewed with the patient and patient agrees. All questions were answered. This scribe documentation was reviewed by me and accurately reflects the examination and decisions made by me. This note will not be viewable in [address]

## 2019-09-17 NOTE — PROGRESS NOTES
Yudy Barrios is a 62 y.o. male    Chief Complaint   Patient presents with    Surgical Follow-up    Medication Refill     1. Have you been to the ER, urgent care clinic since your last visit? Hospitalized since your last visit? No    2. Have you seen or consulted any other health care providers outside of the [address] [place] [address] since your last visit? Include any pap smears or colon screening.  No     Visit Vitals  /77 (BP 1 Location: Left arm, BP Patient Position: Sitting)   Pulse 62   Temp 98.2 °F (36.8 °C) (Oral)   Resp 17   Ht 5' 11\" (1.803 m)   Wt 180 lb (81.6 kg)   SpO2 99%   BMI 25.10 kg/m²

## 2019-10-02 DIAGNOSIS — R21 RASH AND NONSPECIFIC SKIN ERUPTION: ICD-10-CM

## 2019-10-02 RX ORDER — TRETIONIN 0.1 MG/G
GEL TOPICAL
Qty: 45 G | Refills: 1 | Status: SHIPPED | OUTPATIENT
Start: 2019-10-02 | End: 2019-11-01

## 2019-10-03 ENCOUNTER — TELEPHONE (OUTPATIENT)
Dept: PRIMARY CARE CLINIC | Age: 54
End: 2019-10-03

## 2019-11-04 ENCOUNTER — OFFICE VISIT (OUTPATIENT)
Dept: PRIMARY CARE CLINIC | Age: 54
End: 2019-11-04

## 2019-11-04 VITALS
TEMPERATURE: 97.7 F | HEART RATE: 62 BPM | BODY MASS INDEX: 27.02 KG/M2 | RESPIRATION RATE: 16 BRPM | DIASTOLIC BLOOD PRESSURE: 69 MMHG | WEIGHT: 193 LBS | HEIGHT: 71 IN | OXYGEN SATURATION: 98 % | SYSTOLIC BLOOD PRESSURE: 108 MMHG
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
## 2019-11-04 DIAGNOSIS — S06.0X0D CONCUSSION WITHOUT LOSS OF CONSCIOUSNESS, SUBSEQUENT ENCOUNTER: ICD-10-CM

## 2019-11-04 DIAGNOSIS — S01.01XD LACERATION OF SCALP, SUBSEQUENT ENCOUNTER: Primary | ICD-10-CM

## 2019-11-04 DIAGNOSIS — R07.89 CHEST WALL PAIN: ICD-10-CM

## 2019-11-04 DIAGNOSIS — F90.2 ATTENTION DEFICIT HYPERACTIVITY DISORDER (ADHD), COMBINED TYPE: ICD-10-CM

## 2019-11-04 RX ORDER — DEXTROAMPHETAMINE SACCHARATE, AMPHETAMINE ASPARTATE, DEXTROAMPHETAMINE SULFATE AND AMPHETAMINE SULFATE 5; 5; 5; 5 MG/1; MG/1; MG/1; MG/1
1 TABLET ORAL 2 TIMES DAILY
COMMUNITY
Start: 2019-09-17 | End: 2019-11-04 | Stop reason: SDUPTHER

## 2019-11-04 RX ORDER — DEXTROAMPHETAMINE SACCHARATE, AMPHETAMINE ASPARTATE, DEXTROAMPHETAMINE SULFATE AND AMPHETAMINE SULFATE 5; 5; 5; 5 MG/1; MG/1; MG/1; MG/1
20 TABLET ORAL 2 TIMES DAILY
Qty: 60 TAB | Refills: 0 | Status: SHIPPED | OUTPATIENT
Start: 2019-11-04 | End: 2019-12-04

## 2019-11-04 RX ORDER — HYDROCORTISONE 25 MG/G
CREAM TOPICAL
Refills: 2 | COMMUNITY
Start: 2019-10-15 | End: 2021-04-02 | Stop reason: SDUPTHER

## 2019-11-04 RX ORDER — GUAIFENESIN 100 MG/5ML
81 LIQUID (ML) ORAL DAILY
COMMUNITY
End: 2022-09-19 | Stop reason: ALTCHOICE
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
## 2019-11-20 RX ORDER — CLINDAMYCIN PHOSPHATE 10 MG/G
GEL TOPICAL 2 TIMES DAILY
Qty: 1 ML | Refills: 0 | Status: SHIPPED | OUTPATIENT
Start: 2019-11-20 | End: 2020-09-14 | Stop reason: SDUPTHER

## 2019-12-03 ENCOUNTER — TELEPHONE (OUTPATIENT)
Dept: PRIMARY CARE CLINIC | Age: 54
End: 2019-12-03

## 2019-12-03 NOTE — TELEPHONE ENCOUNTER
lvm for patient that we can do referral but we do not do appointment advised that patient can also call number on back of insurance card and check who is in the network.

## 2019-12-03 NOTE — TELEPHONE ENCOUNTER
Patient requesting for provider to schedule a colonoscopy before the end of the year or a name of someone he can contact before the end of the year.     [phone]

## 2019-12-04 DIAGNOSIS — Z12.11 COLON CANCER SCREENING: Primary | ICD-10-CM

## 2019-12-04 NOTE — TELEPHONE ENCOUNTER
Pt requesting a standing referral for colonoscopy Sent to  fax number [phone].  Requested a call back for any inquires

## 2020-05-01 ENCOUNTER — TELEPHONE (OUTPATIENT)
Dept: PRIMARY CARE CLINIC | Age: 55
End: 2020-05-01

## 2020-05-01 ENCOUNTER — VIRTUAL VISIT (OUTPATIENT)
Dept: PRIMARY CARE CLINIC | Age: 55
End: 2020-05-01

## 2020-05-01 DIAGNOSIS — F90.2 ATTENTION DEFICIT HYPERACTIVITY DISORDER (ADHD), COMBINED TYPE: ICD-10-CM

## 2020-05-01 DIAGNOSIS — S76.109A INJURY OF QUADRICEPS TENDON: Primary | ICD-10-CM

## 2020-05-01 DIAGNOSIS — F51.01 PRIMARY INSOMNIA: ICD-10-CM

## 2020-05-01 NOTE — PROGRESS NOTES
Written by Camelia Gonzalez, as dictated by Dr. Gertrude Osullivan MD.    Johnson Shirley is a 62 y.o. male. HPI  I was in the office while conducting this encounter. Consent:  He and/or his healthcare decision maker is aware that this patient-initiated Telehealth encounter is a billable service, with coverage as determined by his insurance carrier. He is aware that he may receive a bill and has provided verbal consent to proceed: Yes    This virtual visit was conducted telephone encounter only. -  I affirm this is a Patient Initiated Episode with an Established Patient who has not had a related appointment within my department in the past 7 days or scheduled within the next 24 hours. Note: this encounter is not billable if this call serves to triage the patient into an appointment for the relevant concern. This visit was intended to be held on [employer] me but due to technical difficulties it was held via telephone. The patient presents today for a follow-up. He had surgery in July 2019 for BL quadriceps tendon rupture. He is still going for PT. He is not sick but he just wants to take some time off of work. 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 He does not want to be around people in case they are sick. He does wear a mask when he goes to work. He is wondering if his surgery made him immunocompromised although it was almost a year ago. He has ADHD and is compliant on Adderall 20 mg as needed. He takes Trazodone 50 mg as needed for sleep.       Patient Active Problem List   Diagnosis Code    Attention deficit hyperactivity disorder (ADHD) F90.9    Insomnia G47.00    Adjustment disorder with mixed anxiety and depressed mood F43.23    S/P left inguinal hernia repair Z98.890, Z87.19        Current Outpatient Medications on File Prior to Visit   Medication Sig Dispense Refill    clindamycin (CLINDAGEL) 1 % topical gel Apply  to affected area two (2) times a day. use thin film on affected area 1 mL 0    aspirin 81 mg chewable tablet Take 81 mg by mouth daily.  hydrocortisone (HYTONE) 2.5 % topical cream APPLY TO AFFECTED AREA 2 TIMES A DAY FOR 15 DAYS, USE A THIN LAYER.  2    ibuprofen (MOTRIN) 800 mg tablet Take 1 Tab by mouth every eight (8) hours as needed for Pain. 30 Tab 1     No current facility-administered medications on file prior to visit.         No Known Allergies    Past Medical History:   Diagnosis Date    ADHD 9/28/2009    Depression 9/28/2009    Left inguinal hernia 9/22/2015    S/P left inguinal hernia repair 1/22/2016       Past Surgical History:   Procedure Laterality Date    HX HEENT  2012 2010    WISDOM TEETH REMOVED    HX HERNIA REPAIR Left 10/23/15    Inguinal w/mesh by Dr. Ashvin Khalil Right 12/2017    HX MOHS PROCEDURES  2010    HX OTHER SURGICAL  2010    QUAD TENDON RE-ATTACHED    HX OTHER SURGICAL Bilateral 07/26/2019    QUAD TENDON RE-ATTACHED       Family History   Problem Relation Age of Onset    Cancer Mother         Stomach Cancer diagnosed age 68    Cancer Maternal Grandfather     Hypertension Paternal Grandmother     Glaucoma Paternal Grandfather     Arthritis-osteo Father     Anesth Problems Neg Hx        Social History     Socioeconomic History    Marital status:      Spouse name: Not on file    Number of children: Not on file    Years of education: Not on file    Highest education level: Not on file   Occupational History    Not on file   Social Needs    Financial resource strain: Not on file    Food insecurity     Worry: Not on file     Inability: Not on file    Transportation needs     Medical: Not on file     Non-medical: Not on file   Tobacco Use    Smoking status: Never Smoker    Smokeless tobacco: Never Used   Substance and Sexual Activity    Alcohol use: No    Drug use: No    Sexual activity: Yes     Partners: Female   Lifestyle    Physical activity     Days per week: Not on file     Minutes per session: Not on file    Stress: Not on file   Relationships    Social connections     Talks on phone: Not on file     Gets together: Not on file     Attends Congregation service: Not on file     Active member of club or organization: Not on file     Attends meetings of clubs or organizations: Not on file     Relationship status: Not on file    Intimate partner violence     Fear of current or ex partner: Not on file     Emotionally abused: Not on file     Physically abused: Not on file     Forced sexual activity: Not on file   Other Topics Concern    Not on file   Social History Narrative    Not on file       No visits with results within 3 Month(s) from this visit. Latest known visit with results is:   Office Visit on 04/17/2019   Component Date Value Ref Range Status    Glucose 04/17/2019 86  65 - 99 mg/dL Final    BUN 04/17/2019 10  6 - 24 mg/dL Final    Creatinine 04/17/2019 0.98  0.76 - 1.27 mg/dL Final    GFR est non-AA 04/17/2019 86  >59 mL/min/1.73 Final    GFR est AA 04/17/2019 100  >59 mL/min/1.73 Final    BUN/Creatinine ratio 04/17/2019 10  9 - 20 Final    Sodium 04/17/2019 142  134 - 144 mmol/L Final    Potassium 04/17/2019 4.5  3.5 - 5.2 mmol/L Final    Chloride 04/17/2019 105  96 - 106 mmol/L Final    CO2 04/17/2019 25  20 - 29 mmol/L Final    Calcium 04/17/2019 10.2  8.7 - 10.2 mg/dL Final    Protein, total 04/17/2019 6.8  6.0 - 8.5 g/dL Final    Albumin 04/17/2019 4.3  3.5 - 5.5 g/dL Final    GLOBULIN, TOTAL 04/17/2019 2.5  1.5 - 4.5 g/dL Final    A-G Ratio 04/17/2019 1.7  1.2 - 2.2 Final    Bilirubin, total 04/17/2019 0.3  0.0 - 1.2 mg/dL Final    Alk.  phosphatase 04/17/2019 63  39 - 117 IU/L Final    AST (SGOT) 04/17/2019 21  0 - 40 IU/L Final    ALT (SGPT) 04/17/2019 17  0 - 44 IU/L Final    WBC 04/17/2019 4.7  3.4 - 10.8 x10E3/uL Final    RBC 04/17/2019 5.92* 4.14 - 5.80 x10E6/uL Final    HGB 04/17/2019 13.4  13.0 - 17.7 g/dL Final    HCT 04/17/2019 42.6  37.5 - 51.0 % Final    MCV 04/17/2019 72* 79 - 97 fL Final    MCH 04/17/2019 22.6* 26.6 - 33.0 pg Final    MCHC 04/17/2019 31.5  31.5 - 35.7 g/dL Final    RDW 04/17/2019 17.1* 12.3 - 15.4 % Final    PLATELET 13/01/9791 213  150 - 379 x10E3/uL Final    Cholesterol, total 04/17/2019 142  100 - 199 mg/dL Final    Triglyceride 04/17/2019 140  0 - 149 mg/dL Final    HDL Cholesterol 04/17/2019 41  >39 mg/dL Final    VLDL, calculated 04/17/2019 28  5 - 40 mg/dL Final    LDL, calculated 04/17/2019 73  0 - 99 mg/dL Final    TSH 04/17/2019 0.978  0.450 - 4.500 uIU/mL Final    Prostate Specific Ag 04/17/2019 1.3  0.0 - 4.0 ng/mL Final    Comment: Roche ECLIA methodology. According to the American Urological Association, Serum PSA should  decrease and remain at undetectable levels after radical  prostatectomy. The AUA defines biochemical recurrence as an initial  PSA value 0.2 ng/mL or greater followed by a subsequent confirmatory  PSA value 0.2 ng/mL or greater. Values obtained with different assay methods or kits cannot be used  interchangeably. Results cannot be interpreted as absolute evidence  of the presence or absence of malignant disease. Review of Systems   Constitutional: Negative for malaise/fatigue. HENT: Negative for congestion. Eyes: Negative for blurred vision. Respiratory: Negative for shortness of breath. Cardiovascular: Negative for chest pain. Gastrointestinal: Negative for constipation, diarrhea and heartburn. Genitourinary: Negative for dysuria. Musculoskeletal: Negative for joint pain and myalgias. Injury to BL quadriceps tendon    Neurological: Negative for dizziness. Psychiatric/Behavioral: Negative for depression. The patient has insomnia. The patient is not nervous/anxious. There were no vitals taken for this visit. Physical Exam  Constitutional:       General: He is not in acute distress.      Appearance: He is not diaphoretic. HENT:      Head: Normocephalic and atraumatic. Pulmonary:      Effort: Pulmonary effort is normal. No respiratory distress. Neurological:      Mental Status: He is alert and oriented to person, place, and time. Psychiatric:         Behavior: Behavior normal.         Thought Content: Thought content normal.         ASSESSMENT and PLAN    ICD-10-CM ICD-9-CM    1. Injury of quadriceps tendon S76.109A 959.6 Stable at this time. Pt is under the care of PT and orthopedics . 2. Attention deficit hyperactivity disorder (ADHD), combined type F90.2 314.01 Stable at this time   3. Primary insomnia F51.01 307.42 Takes Trazodone as needed. Total Time: minutes: 11-20 minutes. This plan was reviewed with the patient and patient agrees. All questions were answered. This scribe documentation was reviewed by me and accurately reflects the examination and decisions made by me. This note will not be viewable in [address]

## 2020-05-01 NOTE — TELEPHONE ENCOUNTER
Confirmed patient id and he states that he recently had surgery and there isnt any social distancing at work and employer is requiring a letter to have him out of work.   Appointment is this afternoon at 12:45     [phone]

## 2020-08-28 ENCOUNTER — OFFICE VISIT (OUTPATIENT)
Dept: PRIMARY CARE CLINIC | Age: 55
End: 2020-08-28
Payer: COMMERCIAL

## 2020-08-28 VITALS
TEMPERATURE: 97.6 F | HEIGHT: 71 IN | WEIGHT: 190.6 LBS | BODY MASS INDEX: 26.68 KG/M2 | HEART RATE: 78 BPM | SYSTOLIC BLOOD PRESSURE: 104 MMHG | DIASTOLIC BLOOD PRESSURE: 71 MMHG | RESPIRATION RATE: 18 BRPM | OXYGEN SATURATION: 98 %

## 2020-08-28 DIAGNOSIS — Z23 NEED FOR SHINGLES VACCINE: ICD-10-CM

## 2020-08-28 DIAGNOSIS — F90.0 ATTENTION DEFICIT HYPERACTIVITY DISORDER (ADHD), PREDOMINANTLY INATTENTIVE TYPE: ICD-10-CM

## 2020-08-28 DIAGNOSIS — Z12.11 COLON CANCER SCREENING: ICD-10-CM

## 2020-08-28 DIAGNOSIS — F41.9 ANXIETY: ICD-10-CM

## 2020-08-28 DIAGNOSIS — Z00.00 PHYSICAL EXAM: Primary | ICD-10-CM

## 2020-08-28 DIAGNOSIS — F51.01 PRIMARY INSOMNIA: ICD-10-CM

## 2020-08-28 LAB
ALBUMIN SERPL-MCNC: 3.9 G/DL (ref 3.5–5)
ALBUMIN/GLOB SERPL: 1.3 {RATIO} (ref 1.1–2.2)
ALP SERPL-CCNC: 70 U/L (ref 45–117)
ALT SERPL-CCNC: 19 U/L (ref 12–78)
ANION GAP SERPL CALC-SCNC: 4 MMOL/L (ref 5–15)
AST SERPL-CCNC: 14 U/L (ref 15–37)
BASOPHILS # BLD: 0 K/UL (ref 0–0.1)
BASOPHILS NFR BLD: 1 % (ref 0–1)
BILIRUB SERPL-MCNC: 0.5 MG/DL (ref 0.2–1)
BUN SERPL-MCNC: 14 MG/DL (ref 6–20)
BUN/CREAT SERPL: 13 (ref 12–20)
CALCIUM SERPL-MCNC: 9.9 MG/DL (ref 8.5–10.1)
CHLORIDE SERPL-SCNC: 109 MMOL/L (ref 97–108)
CHOLEST SERPL-MCNC: 147 MG/DL
CO2 SERPL-SCNC: 29 MMOL/L (ref 21–32)
CREAT SERPL-MCNC: 1.09 MG/DL (ref 0.7–1.3)
DIFFERENTIAL METHOD BLD: ABNORMAL
EOSINOPHIL # BLD: 0 K/UL (ref 0–0.4)
EOSINOPHIL NFR BLD: 1 % (ref 0–7)
ERYTHROCYTE [DISTWIDTH] IN BLOOD BY AUTOMATED COUNT: 16 % (ref 11.5–14.5)
GLOBULIN SER CALC-MCNC: 2.9 G/DL (ref 2–4)
GLUCOSE SERPL-MCNC: 85 MG/DL (ref 65–100)
HCT VFR BLD AUTO: 43.7 % (ref 36.6–50.3)
HDLC SERPL-MCNC: 46 MG/DL
HDLC SERPL: 3.2 {RATIO} (ref 0–5)
HGB BLD-MCNC: 13.1 G/DL (ref 12.1–17)
IMM GRANULOCYTES # BLD AUTO: 0 K/UL (ref 0–0.04)
IMM GRANULOCYTES NFR BLD AUTO: 0 % (ref 0–0.5)
LDLC SERPL CALC-MCNC: 84.6 MG/DL (ref 0–100)
LIPID PROFILE,FLP: NORMAL
LYMPHOCYTES # BLD: 1.3 K/UL (ref 0.8–3.5)
LYMPHOCYTES NFR BLD: 28 % (ref 12–49)
MCH RBC QN AUTO: 22.8 PG (ref 26–34)
MCHC RBC AUTO-ENTMCNC: 30 G/DL (ref 30–36.5)
MCV RBC AUTO: 76.1 FL (ref 80–99)
MONOCYTES # BLD: 0.3 K/UL (ref 0–1)
MONOCYTES NFR BLD: 7 % (ref 5–13)
NEUTS SEG # BLD: 2.9 K/UL (ref 1.8–8)
NEUTS SEG NFR BLD: 63 % (ref 32–75)
NRBC # BLD: 0 K/UL (ref 0–0.01)
NRBC BLD-RTO: 0 PER 100 WBC
PLATELET # BLD AUTO: 194 K/UL (ref 150–400)
PMV BLD AUTO: 11.4 FL (ref 8.9–12.9)
POTASSIUM SERPL-SCNC: 4.2 MMOL/L (ref 3.5–5.1)
PROT SERPL-MCNC: 6.8 G/DL (ref 6.4–8.2)
PSA SERPL-MCNC: 1.7 NG/ML (ref 0.01–4)
RBC # BLD AUTO: 5.74 M/UL (ref 4.1–5.7)
SODIUM SERPL-SCNC: 142 MMOL/L (ref 136–145)
TRIGL SERPL-MCNC: 82 MG/DL (ref ?–150)
TSH SERPL DL<=0.05 MIU/L-ACNC: 0.81 UIU/ML (ref 0.36–3.74)
VLDLC SERPL CALC-MCNC: 16.4 MG/DL
WBC # BLD AUTO: 4.7 K/UL (ref 4.1–11.1)

## 2020-08-28 PROCEDURE — 99396 PREV VISIT EST AGE 40-64: CPT | Performed by: INTERNAL MEDICINE

## 2020-08-28 RX ORDER — DEXTROAMPHETAMINE SACCHARATE, AMPHETAMINE ASPARTATE, DEXTROAMPHETAMINE SULFATE AND AMPHETAMINE SULFATE 5; 5; 5; 5 MG/1; MG/1; MG/1; MG/1
20 TABLET ORAL 2 TIMES DAILY
Qty: 60 TAB | Refills: 0 | Status: SHIPPED | OUTPATIENT
Start: 2020-08-28 | End: 2020-11-17 | Stop reason: SDUPTHER

## 2020-08-28 RX ORDER — ZOSTER VACCINE RECOMBINANT, ADJUVANTED 50 MCG/0.5
0.5 KIT INTRAMUSCULAR ONCE
Qty: 0.5 ML | Refills: 0 | Status: SHIPPED | OUTPATIENT
Start: 2020-08-28 | End: 2020-08-28

## 2020-08-28 NOTE — PROGRESS NOTES
HISTORY OF PRESENT ILLNESS  Delfin Marti is a 60 y.o. male. HPI  Pt presents fasting today for a complete physical exam and labs. He has finished PT for his B/L quadriceps tendon rupture and has started riding his bike regularly or exercise that is easier on his knees. He has occasional lower back pain. He notes that he has had less allergies and congestion since starting to wear a mask. He is interested in restarting Adderall for his ADHD. 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 He has also been having increased stress and not feeling well mentally from all of the unrest in the nation today. He would like to be evaluated for what is going on recently. He requests a referral to a few psychologists so he can find one. He had his most recent colonoscopy done last year, and he gets yearly eye exams. He has not gotten the Shingrix vaccine yet and would like to get it. Patient Active Problem List   Diagnosis Code    Attention deficit hyperactivity disorder (ADHD) F90.9    Insomnia G47.00        Current Outpatient Medications on File Prior to Visit   Medication Sig Dispense Refill    clindamycin (CLINDAGEL) 1 % topical gel Apply  to affected area two (2) times a day. use thin film on affected area 1 mL 0    hydrocortisone (HYTONE) 2.5 % topical cream APPLY TO AFFECTED AREA 2 TIMES A DAY FOR 15 DAYS, USE A THIN LAYER.  2    aspirin 81 mg chewable tablet Take 81 mg by mouth daily. No current facility-administered medications on file prior to visit.         No Known Allergies    Past Medical History:   Diagnosis Date    ADHD 9/28/2009    Depression 9/28/2009    Left inguinal hernia 9/22/2015    S/P left inguinal hernia repair 1/22/2016       Past Surgical History:   Procedure Laterality Date    HX HEENT  2012 2010    WISDOM TEETH REMOVED    HX HERNIA REPAIR Left 10/23/15    Inguinal w/mesh by Dr. Cristian Johnson REPAIR Right 12/2017    HX MOHS PROCEDURES  2010    HX OTHER SURGICAL  2010    QUAD TENDON RE-ATTACHED    HX OTHER SURGICAL Bilateral 07/26/2019    QUAD TENDON RE-ATTACHED       Family History   Problem Relation Age of Onset    Cancer Mother         Stomach Cancer diagnosed age 68    Cancer Maternal Grandfather     Hypertension Paternal Grandmother     Glaucoma Paternal Grandfather     Arthritis-osteo Father     Anesth Problems Neg Hx        Social History     Socioeconomic History    Marital status:      Spouse name: Not on file    Number of children: Not on file    Years of education: Not on file    Highest education level: Not on file   Occupational History    Not on file   Social Needs    Financial resource strain: Not on file    Food insecurity     Worry: Not on file     Inability: Not on file    Transportation needs     Medical: Not on file     Non-medical: Not on file   Tobacco Use    Smoking status: Never Smoker    Smokeless tobacco: Never Used   Substance and Sexual Activity    Alcohol use: No    Drug use: No    Sexual activity: Yes     Partners: Female   Lifestyle    Physical activity     Days per week: Not on file     Minutes per session: Not on file    Stress: Not on file   Relationships    Social connections     Talks on phone: Not on file     Gets together: Not on file     Attends Protestant service: Not on file     Active member of club or organization: Not on file     Attends meetings of clubs or organizations: Not on file     Relationship status: Not on file    Intimate partner violence     Fear of current or ex partner: Not on file     Emotionally abused: Not on file     Physically abused: Not on file     Forced sexual activity: Not on file   Other Topics Concern    Not on file   Social History Narrative    Not on file       No visits with results within 3 Month(s) from this visit.    Latest known visit with results is:   Office Visit on 04/17/2019   Component Date Value Ref Range Status    Glucose 04/17/2019 86  65 - 99 mg/dL Final    BUN 04/17/2019 10  6 - 24 mg/dL Final    Creatinine 04/17/2019 0.98  0.76 - 1.27 mg/dL Final    GFR est non-AA 04/17/2019 86  >59 mL/min/1.73 Final    GFR est AA 04/17/2019 100  >59 mL/min/1.73 Final    BUN/Creatinine ratio 04/17/2019 10  9 - 20 Final    Sodium 04/17/2019 142  134 - 144 mmol/L Final    Potassium 04/17/2019 4.5  3.5 - 5.2 mmol/L Final    Chloride 04/17/2019 105  96 - 106 mmol/L Final    CO2 04/17/2019 25  20 - 29 mmol/L Final    Calcium 04/17/2019 10.2  8.7 - 10.2 mg/dL Final    Protein, total 04/17/2019 6.8  6.0 - 8.5 g/dL Final    Albumin 04/17/2019 4.3  3.5 - 5.5 g/dL Final    GLOBULIN, TOTAL 04/17/2019 2.5  1.5 - 4.5 g/dL Final    A-G Ratio 04/17/2019 1.7  1.2 - 2.2 Final    Bilirubin, total 04/17/2019 0.3  0.0 - 1.2 mg/dL Final    Alk. phosphatase 04/17/2019 63  39 - 117 IU/L Final    AST (SGOT) 04/17/2019 21  0 - 40 IU/L Final    ALT (SGPT) 04/17/2019 17  0 - 44 IU/L Final    WBC 04/17/2019 4.7  3.4 - 10.8 x10E3/uL Final    RBC 04/17/2019 5.92* 4.14 - 5.80 x10E6/uL Final    HGB 04/17/2019 13.4  13.0 - 17.7 g/dL Final    HCT 04/17/2019 42.6  37.5 - 51.0 % Final    MCV 04/17/2019 72* 79 - 97 fL Final    MCH 04/17/2019 22.6* 26.6 - 33.0 pg Final    MCHC 04/17/2019 31.5  31.5 - 35.7 g/dL Final    RDW 04/17/2019 17.1* 12.3 - 15.4 % Final    PLATELET 74/24/6395 929  150 - 379 x10E3/uL Final    Cholesterol, total 04/17/2019 142  100 - 199 mg/dL Final    Triglyceride 04/17/2019 140  0 - 149 mg/dL Final    HDL Cholesterol 04/17/2019 41  >39 mg/dL Final    VLDL, calculated 04/17/2019 28  5 - 40 mg/dL Final    LDL, calculated 04/17/2019 73  0 - 99 mg/dL Final    TSH 04/17/2019 0.978  0.450 - 4.500 uIU/mL Final    Prostate Specific Ag 04/17/2019 1.3  0.0 - 4.0 ng/mL Final    Comment: Roche ECLIA methodology.   According to the American Urological Association, Serum PSA should  decrease and remain at undetectable levels after radical  prostatectomy. The AUA defines biochemical recurrence as an initial  PSA value 0.2 ng/mL or greater followed by a subsequent confirmatory  PSA value 0.2 ng/mL or greater. Values obtained with different assay methods or kits cannot be used  interchangeably. Results cannot be interpreted as absolute evidence  of the presence or absence of malignant disease. Review of Systems   Constitutional: Negative for malaise/fatigue and weight loss. HENT: Negative for congestion and sore throat. Eyes: Negative for blurred vision. Respiratory: Negative for cough and shortness of breath. Cardiovascular: Negative for chest pain and leg swelling. Gastrointestinal: Negative for constipation and heartburn. Genitourinary: Negative for frequency and urgency. Musculoskeletal: Positive for back pain (lower back). Negative for joint pain and myalgias. Neurological: Negative for dizziness and headaches. Endo/Heme/Allergies: Negative for environmental allergies. Psychiatric/Behavioral: Negative for depression. The patient is nervous/anxious. The patient does not have insomnia. +ADHD     Visit Vitals  /71 (BP 1 Location: Left arm, BP Patient Position: Sitting)   Pulse 78   Temp 97.6 °F (36.4 °C) (Temporal)   Resp 18   Ht 5' 11\" (1.803 m)   Wt 190 lb 9.6 oz (86.5 kg)   SpO2 98%   BMI 26.58 kg/m²     Physical Exam  Vitals signs and nursing note reviewed. Constitutional:       General: He is not in acute distress. Appearance: Normal appearance. He is well-developed, well-groomed and normal weight. He is not diaphoretic. HENT:      Right Ear: Tympanic membrane, ear canal and external ear normal.      Left Ear: Tympanic membrane, ear canal and external ear normal.      Mouth/Throat:      Mouth: Mucous membranes are moist.      Pharynx: Oropharynx is clear. Eyes:      General: No scleral icterus. Right eye: No discharge. Left eye: No discharge. Extraocular Movements: Extraocular movements intact. Conjunctiva/sclera: Conjunctivae normal.   Neck:      Musculoskeletal: Normal range of motion and neck supple. Thyroid: No thyromegaly. Cardiovascular:      Rate and Rhythm: Normal rate and regular rhythm. Pulses: Normal pulses. Dorsalis pedis pulses are 2+ on the right side and 2+ on the left side. Pulmonary:      Effort: Pulmonary effort is normal.      Breath sounds: Normal breath sounds. No wheezing. Abdominal:      General: Bowel sounds are normal. There is no distension. Palpations: Abdomen is soft. Tenderness: There is no abdominal tenderness. Musculoskeletal:      Right lower leg: No edema. Left lower leg: No edema. Comments: B/L knees without crepitus   Lymphadenopathy:      Cervical: No cervical adenopathy. Neurological:      Mental Status: He is alert and oriented to person, place, and time. Deep Tendon Reflexes:      Reflex Scores:       Patellar reflexes are 2+ on the right side and 2+ on the left side. Psychiatric:         Mood and Affect: Mood and affect normal.         Behavior: Behavior normal.       ASSESSMENT and PLAN    ICD-10-CM ICD-9-CM    1. Physical exam  Z00.00 V70.9 CBC WITH AUTOMATED DIFF      METABOLIC PANEL, COMPREHENSIVE      TSH 3RD GENERATION      LIPID PANEL      PSA, DIAGNOSTIC (PROSTATE SPECIFIC AG)      CBC WITH AUTOMATED DIFF      METABOLIC PANEL, COMPREHENSIVE      TSH 3RD GENERATION      LIPID PANEL      PSA, DIAGNOSTIC (PROSTATE SPECIFIC AG)    Complete physical exam done. Basic labs drawn. 2. Primary insomnia  F51.01 307.42 He is sleeping well recently. 3. Attention deficit hyperactivity disorder (ADHD), predominantly inattentive type  F90.0 314.00 dextroamphetamine-amphetamine (ADDERALL) 20 mg tablet sent to pharmacy. I refilled his Adderall. [facility] and the [facility] reviewed and I am the only provider.       4. Colon cancer screening  Z12.11 V76.51 OCCULT BLOOD IMMUNOASSAY,DIAGNOSTIC    I ordered a stool sample for health maintenance. 5. Need for shingles vaccine  Z23 V04.89 varicella-zoster recombinant, PF, (Shingrix, PF,) 50 mcg/0.5 mL susr injection sent to pharmacy. Potential side effects were discussed. I prescribed the Shingrix vaccine for health maintenance. 6. Anxiety  F41.9 300.00 REFERRAL TO PSYCHOLOGY    I provided a print-out with information on multiple resources for him to find a psychologist. I also provided him Dr. Viry Curran number ([phone]). This plan was reviewed with the patient and patient agrees. All questions were answered. This scribe documentation was reviewed by me and accurately reflects the examination and decisions made by me. This note will not be viewable in [address]

## 2020-08-28 NOTE — PROGRESS NOTES
Chief Complaint   Patient presents with    Physical     labs.     Medication Evaluation     requesting restart of adderral

## 2020-09-02 NOTE — PROGRESS NOTES
Shanthi Nava, hope you are doing well. Your blood work came back fine. Cholesterol numbers look good. PSA came back fine as well.

## 2020-09-10 LAB — HEMOCCULT STL QL IA: NEGATIVE

## 2020-09-14 RX ORDER — CLINDAMYCIN PHOSPHATE 10 MG/G
GEL TOPICAL 2 TIMES DAILY
Qty: 1 ML | Refills: 0 | Status: SHIPPED | OUTPATIENT
Start: 2020-09-14 | End: 2021-08-31 | Stop reason: SDUPTHER

## 2020-10-09 ENCOUNTER — TELEPHONE (OUTPATIENT)
Dept: PRIMARY CARE CLINIC | Age: 55
End: 2020-10-09

## 2020-11-17 DIAGNOSIS — F90.0 ATTENTION DEFICIT HYPERACTIVITY DISORDER (ADHD), PREDOMINANTLY INATTENTIVE TYPE: ICD-10-CM

## 2020-11-17 RX ORDER — DEXTROAMPHETAMINE SACCHARATE, AMPHETAMINE ASPARTATE, DEXTROAMPHETAMINE SULFATE AND AMPHETAMINE SULFATE 5; 5; 5; 5 MG/1; MG/1; MG/1; MG/1
20 TABLET ORAL 2 TIMES DAILY
Qty: 60 TAB | Refills: 0 | Status: SHIPPED | OUTPATIENT
Start: 2020-11-17 | End: 2020-12-22 | Stop reason: SDUPTHER

## 2020-12-22 DIAGNOSIS — F90.0 ATTENTION DEFICIT HYPERACTIVITY DISORDER (ADHD), PREDOMINANTLY INATTENTIVE TYPE: ICD-10-CM

## 2020-12-22 RX ORDER — DEXTROAMPHETAMINE SACCHARATE, AMPHETAMINE ASPARTATE, DEXTROAMPHETAMINE SULFATE AND AMPHETAMINE SULFATE 5; 5; 5; 5 MG/1; MG/1; MG/1; MG/1
20 TABLET ORAL 2 TIMES DAILY
Qty: 60 TAB | Refills: 0 | Status: SHIPPED | OUTPATIENT
Start: 2020-12-22 | End: 2021-02-09 | Stop reason: SDUPTHER

## 2021-02-05 DIAGNOSIS — F98.8 ADD (ATTENTION DEFICIT DISORDER): ICD-10-CM

## 2021-02-05 NOTE — TELEPHONE ENCOUNTER
I have reviewed the anticoagulation track calender, labs, and dosage adjustments made by Mariella Issa RN and I agree.    Electronically signed by WEST Lan 07/11/17 3:57 PM    Pharmacy requesting refill of dextroamp-amphetamin 20mg tab  Take 1 tab by mouth two times daily, max daily dose of 40mg.

## 2021-02-09 DIAGNOSIS — F90.0 ATTENTION DEFICIT HYPERACTIVITY DISORDER (ADHD), PREDOMINANTLY INATTENTIVE TYPE: ICD-10-CM

## 2021-02-09 NOTE — TELEPHONE ENCOUNTER
Pharmacy request:     Medication: DEXTROAMP-AMPHETAMIN 20 MG TAB     Si TABLET BY MOUTH TWO TIMES DAILY. MAXIMUM DAILY DOSE OF 40MG (2 TABLETS) PER DAY.      Oty: 58

## 2021-02-11 RX ORDER — DEXTROAMPHETAMINE SACCHARATE, AMPHETAMINE ASPARTATE, DEXTROAMPHETAMINE SULFATE AND AMPHETAMINE SULFATE 5; 5; 5; 5 MG/1; MG/1; MG/1; MG/1
20 TABLET ORAL 2 TIMES DAILY
Qty: 60 TAB | Refills: 0 | Status: SHIPPED | OUTPATIENT
Start: 2021-02-11 | End: 2021-03-13

## 2021-02-12 RX ORDER — DEXTROAMPHETAMINE SACCHARATE, AMPHETAMINE ASPARTATE MONOHYDRATE, DEXTROAMPHETAMINE SULFATE AND AMPHETAMINE SULFATE 5; 5; 5; 5 MG/1; MG/1; MG/1; MG/1
20 CAPSULE, EXTENDED RELEASE ORAL 2 TIMES DAILY
Qty: 30 CAP | Refills: 0 | OUTPATIENT
Start: 2021-02-12 | End: 2021-03-14

## 2021-03-05 ENCOUNTER — TELEPHONE (OUTPATIENT)
Dept: PRIMARY CARE CLINIC | Age: 56
End: 2021-03-05

## 2021-03-05 NOTE — TELEPHONE ENCOUNTER
Patient calling just wanted to let Dr. Jaquelin Rodriguez know he tested positive for COVID on 03/03/2021.  Patient wants to know if he can get any medications to help fight COVID

## 2021-03-08 ENCOUNTER — VIRTUAL VISIT (OUTPATIENT)
Dept: PRIMARY CARE CLINIC | Age: 56
End: 2021-03-08
Payer: COMMERCIAL

## 2021-03-08 DIAGNOSIS — U07.1 BRONCHITIS DUE TO COVID-19 VIRUS: Primary | ICD-10-CM

## 2021-03-08 DIAGNOSIS — R43.2 LOSS OF TASTE: ICD-10-CM

## 2021-03-08 DIAGNOSIS — R53.83 OTHER FATIGUE: ICD-10-CM

## 2021-03-08 DIAGNOSIS — J40 BRONCHITIS DUE TO COVID-19 VIRUS: Primary | ICD-10-CM

## 2021-03-08 DIAGNOSIS — R50.9 FEVER AND CHILLS: ICD-10-CM

## 2021-03-08 PROCEDURE — 99214 OFFICE O/P EST MOD 30 MIN: CPT | Performed by: INTERNAL MEDICINE

## 2021-03-08 RX ORDER — AZITHROMYCIN 250 MG/1
TABLET, FILM COATED ORAL
Qty: 6 TAB | Refills: 0 | Status: SHIPPED | OUTPATIENT
Start: 2021-03-08 | End: 2021-03-13

## 2021-03-08 RX ORDER — METHYLPREDNISOLONE 4 MG/1
TABLET ORAL
Qty: 1 DOSE PACK | Refills: 0 | Status: SHIPPED | OUTPATIENT
Start: 2021-03-08 | End: 2021-04-02 | Stop reason: ALTCHOICE

## 2021-03-08 RX ORDER — BENZONATATE 200 MG/1
200 CAPSULE ORAL
Qty: 21 CAP | Refills: 0 | Status: SHIPPED | OUTPATIENT
Start: 2021-03-08 | End: 2021-03-15

## 2021-03-08 NOTE — PROGRESS NOTES
Todd Nicolas (: 1965) is a 60 y.o. male, established patient, here for evaluation of the following chief complaint(s):   No chief complaint on file. Written by Oscar Vasquez, as dictated by Dr. Candace Fischer MD.    ASSESSMENT/PLAN:  1. Bronchitis due to COVID-19 virus  -     azithromycin (ZITHROMAX) 250 mg tablet; use as directed, Normal, Disp-6 Tab, R-0 sent to pharmacy. Potential side effects were discussed. -     methylPREDNISolone (MEDROL DOSEPACK) 4 mg tablet; As directed, Normal, Disp-1 Dose Pack, R-0 sent to pharmacy. Potential side effects were discussed. -     benzonatate (TESSALON) 200 mg capsule; Take 1 Cap by mouth three (3) times daily as needed for Cough for up to 7 days. , Normal, Disp-21 Cap, R-0 sent to pharmacy. Potential side effects were discussed. I prescribed Zithromax, a Medrol dose pack, and Tessalon. He should begin to see an improvement in his sx within a week, and he should contact me if he does not. If he develops suddenly worsening sx or difficulty breathing, he should go to the ED. 2. Fever and chills  I instructed him to start taking Emergen-C every day. Explained that this contains vitamin D, vitamin C, and zinc which will strengthen his immune system to fight against COVID. 3. Other fatigue  I instructed him to stay hydrated, continually sipping on water all day. He can take Tylenol PRN for any aches and pains. 4. Loss of taste  Despite his loss of taste and lack of appetite, I encouraged him to eat something regularly. For example, he needs to start having chicken broth regularly in order to keep his strength up. SUBJECTIVE/OBJECTIVE:  HPI  Pt presents virtually today to discuss COVID infection. The pt's sx began on 21 and his COVID test came back positive at [facility]. He did not have a chest XR done. Today, his sx include fatigue, fever/chills, and cough.  His cough is dry and persistent but he denies any chest tightness, SOB, or chest pain. He denies any HA at this time. He does not sleep for very long, only for an hour or two right now. He wakes up frequently and is unable to go into a deep sleep. He has been taking Tylenol PRN and trying to stay hydrated, but he is not eating much at all because he does not have any appetite. He does not feel like he is making any progress with his sx. Concerned symptoms are getting worse. Patient Active Problem List   Diagnosis Code    Attention deficit hyperactivity disorder (ADHD) F90.9    Insomnia G47.00        Current Outpatient Medications on File Prior to Visit   Medication Sig Dispense Refill    dextroamphetamine-amphetamine (ADDERALL) 20 mg tablet Take 1 Tab by mouth two (2) times a day for 30 days. Max Daily Amount: 40 mg. 60 Tab 0    clindamycin (CLINDAGEL) 1 % topical gel Apply  to affected area two (2) times a day. use thin film on affected area 1 mL 0    aspirin 81 mg chewable tablet Take 81 mg by mouth daily.  hydrocortisone (HYTONE) 2.5 % topical cream APPLY TO AFFECTED AREA 2 TIMES A DAY FOR 15 DAYS, USE A THIN LAYER.  2     No current facility-administered medications on file prior to visit.         No Known Allergies    Past Medical History:   Diagnosis Date    ADHD 9/28/2009    Depression 9/28/2009    Left inguinal hernia 9/22/2015    S/P left inguinal hernia repair 1/22/2016       Past Surgical History:   Procedure Laterality Date    HX HEENT  2012 2010    WISDOM TEETH REMOVED    HX HERNIA REPAIR Left 10/23/15    Inguinal w/mesh by Dr. Jose M Browne Right 12/2017    HX MOHS PROCEDURES  2010    HX OTHER SURGICAL  2010    QUAD TENDON RE-ATTACHED    HX OTHER SURGICAL Bilateral 07/26/2019    QUAD TENDON RE-ATTACHED       Family History   Problem Relation Age of Onset    Cancer Mother         Stomach Cancer diagnosed age 73    Cancer Maternal Grandfather     Hypertension Paternal Grandmother     Glaucoma Paternal Grandfather    Zoya Mendez Arthritis-osteo Father     Anesth Problems Neg Hx        Social History     Socioeconomic History    Marital status:      Spouse name: Not on file    Number of children: Not on file    Years of education: Not on file    Highest education level: Not on file   Occupational History    Not on file   Social Needs    Financial resource strain: Not on file    Food insecurity     Worry: Not on file     Inability: Not on file   [employer] needs     Medical: Not on file     Non-medical: Not on file   Tobacco Use    Smoking status: Never Smoker    Smokeless tobacco: Never Used   Substance and Sexual Activity    Alcohol use: No    Drug use: No    Sexual activity: Yes     Partners: Female   Lifestyle    Physical activity     Days per week: Not on file     Minutes per session: Not on file    Stress: Not on file   Relationships    Social connections     Talks on phone: Not on file     Gets together: Not on file     Attends Gnosticism service: Not on file     Active member of club or organization: Not on file     Attends meetings of clubs or organizations: Not on file     Relationship status: Not on file    Intimate partner violence     Fear of current or ex partner: Not on file     Emotionally abused: Not on file     Physically abused: Not on file     Forced sexual activity: Not on file   Other Topics Concern    Not on file   Social History Narrative    Not on file       No visits with results within 3 Month(s) from this visit. Latest known visit with results is:   Orders Only on 08/31/2020   Component Date Value Ref Range Status    Occult blood fecal, by [place] 08/31/2020 Negative  Negative Final     Review of Systems   Constitutional: Positive for appetite change, chills, fatigue and fever. Negative for activity change and unexpected weight change. HENT: Negative for congestion, ear discharge, ear pain, hearing loss, rhinorrhea and sore throat. Eyes: Negative for pain, discharge and redness. Respiratory: Positive for cough. Negative for chest tightness and shortness of breath. Cardiovascular: Negative for leg swelling. Gastrointestinal: Negative for abdominal pain, constipation and diarrhea. Endocrine: Negative for polyuria. Genitourinary: Negative for dysuria, flank pain and urgency. Musculoskeletal: Negative for arthralgias, back pain and myalgias. Skin: Negative for color change. Neurological: Negative for dizziness, light-headedness and headaches. Psychiatric/Behavioral: Positive for sleep disturbance. Negative for dysphoric mood. The patient is not nervous/anxious.            Physical Exam    [INSTRUCTIONS:  \"[x]\" Indicates a positive item  \"[]\" Indicates a negative item  -- DELETE ALL ITEMS NOT EXAMINED]    Constitutional: [x] Appears well-developed and well-nourished [x] No apparent distress      [x] Abnormal - ill-appearing    Mental status: [x] Alert and awake  [x] Oriented to person/place/time [x] Able to follow commands    [] Abnormal -     Eyes:   EOM    [x]  Normal    [] Abnormal -   Sclera  [x]  Normal    [] Abnormal -          Discharge [x]  None visible   [] Abnormal -     HENT: [x] Normocephalic, atraumatic  [] Abnormal -   [x] Mouth/Throat: Mucous membranes are moist    External Ears [x] Normal  [] Abnormal -    Neck: [x] No visualized mass [] Abnormal -     Pulmonary/Chest: [x] Respiratory effort normal   [x] No visualized signs of difficulty breathing or respiratory distress        [] Abnormal -      Musculoskeletal:   [x] Normal gait with no signs of ataxia         [x] Normal range of motion of neck        [] Abnormal -     Neurological:        [x] No Facial Asymmetry (Cranial nerve 7 motor function) (limited exam due to video visit)          [x] No gaze palsy        [] Abnormal -          Skin:        [] No significant exanthematous lesions or discoloration noted on facial skin         [x] Abnormal - pale           Psychiatric:       [x] Normal Affect [] Abnormal -        [x] No Hallucinations    Other pertinent observable physical exam findings:-    Anderson Rogers, was evaluated through a synchronous (real-time) audio-video encounter. The patient (or guardian if applicable) is aware that this is a billable service. Verbal consent to proceed has been obtained within the past 12 months. The visit was conducted pursuant to the emergency declaration under the [address] [place] [address] authority and the [employer] General Act. Patient identification was verified, and a caregiver was present when appropriate. The patient was located in a state where the provider was credentialed to provide care. An electronic signature was used to authenticate this note.   -- Meena Villarreal

## 2021-03-17 ENCOUNTER — TELEPHONE (OUTPATIENT)
Dept: PRIMARY CARE CLINIC | Age: 56
End: 2021-03-17

## 2021-04-02 ENCOUNTER — HOSPITAL ENCOUNTER (OUTPATIENT)
Dept: GENERAL RADIOLOGY | Age: 56
Discharge: HOME | End: 2021-04-02
Attending: INTERNAL MEDICINE
Payer: COMMERCIAL

## 2021-04-02 ENCOUNTER — OFFICE VISIT (OUTPATIENT)
Dept: PRIMARY CARE CLINIC | Age: 56
End: 2021-04-02
Payer: COMMERCIAL

## 2021-04-02 VITALS
BODY MASS INDEX: 25.6 KG/M2 | OXYGEN SATURATION: 98 % | TEMPERATURE: 98.2 F | HEART RATE: 67 BPM | DIASTOLIC BLOOD PRESSURE: 66 MMHG | RESPIRATION RATE: 16 BRPM | HEIGHT: 72 IN | WEIGHT: 189 LBS | SYSTOLIC BLOOD PRESSURE: 101 MMHG

## 2021-04-02 DIAGNOSIS — H61.23 BILATERAL IMPACTED CERUMEN: Primary | ICD-10-CM

## 2021-04-02 DIAGNOSIS — H91.91 DECREASED HEARING OF RIGHT EAR: ICD-10-CM

## 2021-04-02 DIAGNOSIS — R07.89 SENSATION OF CHEST TIGHTNESS: ICD-10-CM

## 2021-04-02 DIAGNOSIS — Z86.16 HISTORY OF COVID-19: ICD-10-CM

## 2021-04-02 DIAGNOSIS — L30.8 OTHER ECZEMA: ICD-10-CM

## 2021-04-02 DIAGNOSIS — F90.0 ATTENTION DEFICIT HYPERACTIVITY DISORDER (ADHD), PREDOMINANTLY INATTENTIVE TYPE: ICD-10-CM

## 2021-04-02 DIAGNOSIS — F51.01 PRIMARY INSOMNIA: ICD-10-CM

## 2021-04-02 PROCEDURE — 69210 REMOVE IMPACTED EAR WAX UNI: CPT | Performed by: INTERNAL MEDICINE

## 2021-04-02 PROCEDURE — 71046 X-RAY EXAM CHEST 2 VIEWS: CPT

## 2021-04-02 PROCEDURE — 99214 OFFICE O/P EST MOD 30 MIN: CPT | Performed by: INTERNAL MEDICINE

## 2021-04-02 RX ORDER — HYDROCORTISONE 25 MG/G
CREAM TOPICAL 2 TIMES DAILY
Qty: 30 G | Refills: 2 | Status: SHIPPED | OUTPATIENT
Start: 2021-04-02 | End: 2021-05-02

## 2021-04-02 RX ORDER — DEXTROAMPHETAMINE SACCHARATE, AMPHETAMINE ASPARTATE, DEXTROAMPHETAMINE SULFATE AND AMPHETAMINE SULFATE 5; 5; 5; 5 MG/1; MG/1; MG/1; MG/1
20 TABLET ORAL 2 TIMES DAILY
COMMUNITY
End: 2021-04-02 | Stop reason: SDUPTHER

## 2021-04-02 RX ORDER — TRAZODONE HYDROCHLORIDE 50 MG/1
TABLET ORAL
COMMUNITY
End: 2021-04-02 | Stop reason: DRUGHIGH

## 2021-04-02 RX ORDER — DEXTROAMPHETAMINE SACCHARATE, AMPHETAMINE ASPARTATE, DEXTROAMPHETAMINE SULFATE AND AMPHETAMINE SULFATE 5; 5; 5; 5 MG/1; MG/1; MG/1; MG/1
20 TABLET ORAL 2 TIMES DAILY
Qty: 60 TAB | Refills: 0 | Status: SHIPPED | OUTPATIENT
Start: 2021-04-02 | End: 2021-05-02

## 2021-04-02 RX ORDER — TRAZODONE HYDROCHLORIDE 100 MG/1
100 TABLET ORAL
Qty: 90 TAB | Refills: 0 | Status: SHIPPED | OUTPATIENT
Start: 2021-04-02 | End: 2021-07-01

## 2021-04-02 NOTE — PROGRESS NOTES
Written by Greg Mendoza, as dictated by Dr. Libra Ott MD.    Jocelynn Chen (: 1965) is a 60 y.o. male, established patient, here for evaluation of the following chief complaint(s):  Ear Pressure (sound is muffled/ and clogged, when he talks, he says there is a ringing and echoing in his ear, Right ear mainly, )       ASSESSMENT/PLAN:  1. Bilateral impacted cerumen  -     REMOVE IMPACTED EAR WAX  Time out: Immediately prior to procedure a \"time out\" was called to verify the correct patient, procedure, equipment, support staff and site/side marked as required. Ceruminosis is noted. Wax is removed by syringing and manual debridement. Instructions for home care to prevent wax buildup are given. 2. Decreased hearing of right ear  Cerumen was removed today from his ear, this should help with his hearing. 3. History of COVID-19  -     XR CHEST PA LAT; Future  I ordered a chest XR to evaluate for the presence of any changes or damage from COVID. 4. Sensation of chest tightness  -     XR CHEST PA LAT; Future  I ordered a chest XR to evaluate for the presence of any changes or damage from COVID. 5. Primary insomnia  -     traZODone (DESYREL) 100 mg tablet; Take 1 Tab by mouth nightly for 90 days. , Normal, Disp-90 Tab, R-0 sent to pharmacy. I refilled his trazodone and increased the dose from 50 mg to 100 mg qPM as the 50 mg dose was not helping him sleep through the night. 6. Attention deficit hyperactivity disorder (ADHD), predominantly inattentive type  -     dextroamphetamine-amphetamine (AdderalL) 20 mg tablet; Take 1 Tab by mouth two (2) times a day for 30 days. Max Daily Amount: 40 mg., Normal, Disp-60 Tab, R-0 sent to pharmacy. I refilled his Adderall. I have reviewed the patient's controlled substance prescription history thru the Prescription Monitoring Program, so that the prescription(s) for a controlled substance can be given.     7. Other eczema  - hydrocortisone (HYTONE) 2.5 % topical cream; Apply  to affected area two (2) times a day for 30 days. use thin layer, Normal, Disp-30 g, R-2 sent to pharmacy. I refilled his hydrocortisone cream.    No follow-ups on file. SUBJECTIVE/OBJECTIVE:  HPI  Pt presents today c/o blockage in his hears. His R ear is especially bothering him and he feels that he cannot hear in it. Even in his L ear he can feel the buildup beginning. He has been using the eardrop recipe I recommended to him to prevent buildup, but this still happens. He has been taking trazodone 50 mg qPM and cant typically fall asleep, well, but then he wakes up frequently during the night. Since he recovered from [facility], he has been wondering if he can get his lungs checked for residual damage. He has been able to return to exercise but gets the sensation of chest tightness. Patient Active Problem List   Diagnosis Code    Attention deficit hyperactivity disorder (ADHD) F90.9    Insomnia G47.00        Current Outpatient Medications on File Prior to Visit   Medication Sig Dispense Refill    clindamycin (CLINDAGEL) 1 % topical gel Apply  to affected area two (2) times a day. use thin film on affected area 1 mL 0    [DISCONTINUED] dextroamphetamine-amphetamine (AdderalL) 20 mg tablet Take 20 mg by mouth two (2) times a day.  [DISCONTINUED] traZODone (DESYREL) 50 mg tablet Take  by mouth nightly.  [DISCONTINUED] methylPREDNISolone (MEDROL DOSEPACK) 4 mg tablet As directed 1 Dose Pack 0    aspirin 81 mg chewable tablet Take 81 mg by mouth daily.  [DISCONTINUED] hydrocortisone (HYTONE) 2.5 % topical cream APPLY TO AFFECTED AREA 2 TIMES A DAY FOR 15 DAYS, USE A THIN LAYER.  2     No current facility-administered medications on file prior to visit.         No Known Allergies    Past Medical History:   Diagnosis Date    ADHD 9/28/2009    Depression 9/28/2009    Left inguinal hernia 9/22/2015    S/P left inguinal hernia repair 1/22/2016       Past Surgical History:   Procedure Laterality Date    HX HEENT  2012 2010    WISDOM TEETH REMOVED    HX HERNIA REPAIR Left 10/23/15    Inguinal w/mesh by Dr. Xavi Lamb Right 12/2017    HX MOHS PROCEDURES  2010    HX OTHER SURGICAL  2010    QUAD TENDON RE-ATTACHED    HX OTHER SURGICAL Bilateral 07/26/2019    QUAD TENDON RE-ATTACHED       Family History   Problem Relation Age of Onset    Cancer Mother         Stomach Cancer diagnosed age 68    Cancer Maternal Grandfather     Hypertension Paternal Grandmother     Glaucoma Paternal Grandfather     Arthritis-osteo Father     Anesth Problems Neg Hx        Social History     Socioeconomic History    Marital status:      Spouse name: Not on file    Number of children: Not on file    Years of education: Not on file    Highest education level: Not on file   Occupational History    Not on file   Social Needs    Financial resource strain: Not on file    Food insecurity     Worry: Not on file     Inability: Not on file    Transportation needs     Medical: Not on file     Non-medical: Not on file   Tobacco Use    Smoking status: Never Smoker    Smokeless tobacco: Never Used   Substance and Sexual Activity    Alcohol use: No    Drug use: No    Sexual activity: Yes     Partners: Female   Lifestyle    Physical activity     Days per week: Not on file     Minutes per session: Not on file    Stress: Not on file   Relationships    Social connections     Talks on phone: Not on file     Gets together: Not on file     Attends Bahai service: Not on file     Active member of club or organization: Not on file     Attends meetings of clubs or organizations: Not on file     Relationship status: Not on file    Intimate partner violence     Fear of current or ex partner: Not on file     Emotionally abused: Not on file     Physically abused: Not on file     Forced sexual activity: Not on file   Other Topics Concern    Not on file   Social History Narrative    Not on file       No visits with results within 3 Month(s) from this visit. Latest known visit with results is:   Orders Only on 08/31/2020   Component Date Value Ref Range Status    Occult blood fecal, by [place] 08/31/2020 Negative  Negative Final     Review of Systems   Constitutional: Negative for activity change, fatigue and unexpected weight change. HENT: Positive for hearing loss (R ear). Negative for congestion, ear discharge, ear pain, rhinorrhea and sore throat.         +ear fullness   Eyes: Negative for pain, discharge and redness. Respiratory: Positive for chest tightness. Negative for cough and shortness of breath. Cardiovascular: Negative for leg swelling. Gastrointestinal: Negative for abdominal pain, constipation and diarrhea. Endocrine: Negative for polyuria. Genitourinary: Negative for dysuria, flank pain and urgency. Musculoskeletal: Negative for arthralgias, back pain and myalgias. Skin: Negative for color change. Neurological: Negative for dizziness, light-headedness and headaches. Psychiatric/Behavioral: Positive for sleep disturbance. Negative for dysphoric mood. The patient is not nervous/anxious. Visit Vitals  /66 (BP 1 Location: Right upper arm, BP Patient Position: Sitting, BP Cuff Size: Adult)   Pulse 67   Temp 98.2 °F (36.8 °C) (Temporal)   Resp 16   Ht 6' (1.829 m)   Wt 189 lb (85.7 kg)   SpO2 98%   BMI 25.63 kg/m²     Physical Exam  Vitals signs and nursing note reviewed. Constitutional:       General: He is not in acute distress. Appearance: Normal appearance. He is well-developed. He is not diaphoretic. HENT:      Head: Normocephalic and atraumatic. Right Ear: External ear normal. There is impacted cerumen. Left Ear: External ear normal.      Ears:        Mouth/Throat:      Mouth: Mucous membranes are moist.      Pharynx: Oropharynx is clear. Eyes:      General: No scleral icterus.         Right eye: No discharge. Left eye: No discharge. Extraocular Movements: Extraocular movements intact. Conjunctiva/sclera: Conjunctivae normal.      Pupils: Pupils are equal, round, and reactive to light. Cardiovascular:      Rate and Rhythm: Normal rate and regular rhythm. Pulses: Normal pulses. Heart sounds: Normal heart sounds. Pulmonary:      Effort: Pulmonary effort is normal.      Breath sounds: Normal breath sounds. No wheezing. Musculoskeletal:      Right lower leg: No edema. Left lower leg: No edema. Neurological:      Mental Status: He is alert and oriented to person, place, and time. Psychiatric:         Mood and Affect: Mood and affect normal.         An electronic signature was used to authenticate this note.   -- Wally Harrison

## 2021-04-02 NOTE — PROGRESS NOTES
Chief Complaint   Patient presents with    Ear Pressure     sound is muffled/ and clogged, when he talks, he says there is a ringing and echoing in his ear, Right ear mainly,      1. Have you been to the ER, urgent care clinic since your last visit? Hospitalized since your last visit? No    2. Have you seen or consulted any other health care providers outside of the [address] [place] [address] since your last visit? Include any pap smears or colon screening.  No

## 2021-07-29 ENCOUNTER — TELEPHONE (OUTPATIENT)
Dept: PRIMARY CARE CLINIC | Age: 56
End: 2021-07-29

## 2021-07-29 DIAGNOSIS — E78.2 MIXED HYPERLIPIDEMIA: ICD-10-CM

## 2021-07-29 DIAGNOSIS — E03.9 ACQUIRED HYPOTHYROIDISM: ICD-10-CM

## 2021-07-29 DIAGNOSIS — E11.9 CONTROLLED TYPE 2 DIABETES MELLITUS WITHOUT COMPLICATION, WITHOUT LONG-TERM CURRENT USE OF INSULIN (HCC): ICD-10-CM

## 2021-07-29 DIAGNOSIS — Z11.59 ENCOUNTER FOR HEPATITIS C SCREENING TEST FOR LOW RISK PATIENT: Primary | ICD-10-CM

## 2021-07-29 DIAGNOSIS — Z12.5 PROSTATE CANCER SCREENING: ICD-10-CM

## 2021-07-29 DIAGNOSIS — I10 ESSENTIAL HYPERTENSION: ICD-10-CM

## 2021-07-29 DIAGNOSIS — Z00.00 PHYSICAL EXAM: ICD-10-CM

## 2021-07-29 NOTE — TELEPHONE ENCOUNTER
Pt is scheduled for physical on 8/31. Wants to know if the labs can be put in so he can come on 8/25 to get done?

## 2021-08-31 ENCOUNTER — OFFICE VISIT (OUTPATIENT)
Dept: PRIMARY CARE CLINIC | Age: 56
End: 2021-08-31
Payer: COMMERCIAL

## 2021-08-31 VITALS
OXYGEN SATURATION: 98 % | RESPIRATION RATE: 15 BRPM | TEMPERATURE: 97.3 F | SYSTOLIC BLOOD PRESSURE: 119 MMHG | DIASTOLIC BLOOD PRESSURE: 76 MMHG | BODY MASS INDEX: 25.17 KG/M2 | WEIGHT: 185.8 LBS | HEIGHT: 72 IN | HEART RATE: 67 BPM

## 2021-08-31 DIAGNOSIS — F90.0 ATTENTION DEFICIT HYPERACTIVITY DISORDER (ADHD), PREDOMINANTLY INATTENTIVE TYPE: ICD-10-CM

## 2021-08-31 DIAGNOSIS — L70.5 EXCORIATED ACNE: ICD-10-CM

## 2021-08-31 DIAGNOSIS — Z00.00 PHYSICAL EXAM: Primary | ICD-10-CM

## 2021-08-31 PROCEDURE — 99396 PREV VISIT EST AGE 40-64: CPT | Performed by: INTERNAL MEDICINE

## 2021-08-31 RX ORDER — DEXTROAMPHETAMINE SACCHARATE, AMPHETAMINE ASPARTATE, DEXTROAMPHETAMINE SULFATE AND AMPHETAMINE SULFATE 5; 5; 5; 5 MG/1; MG/1; MG/1; MG/1
20 TABLET ORAL
COMMUNITY
End: 2021-08-31 | Stop reason: SDUPTHER

## 2021-08-31 RX ORDER — HYDROCORTISONE 25 MG/G
CREAM TOPICAL 2 TIMES DAILY
Qty: 30 G | Refills: 1 | Status: SHIPPED | OUTPATIENT
Start: 2021-08-31 | End: 2022-08-12 | Stop reason: SDUPTHER

## 2021-08-31 RX ORDER — HYDROCORTISONE 25 MG/G
CREAM TOPICAL 2 TIMES DAILY
COMMUNITY
End: 2021-08-31 | Stop reason: SDUPTHER

## 2021-08-31 RX ORDER — HYDROCORTISONE 25 MG/G
CREAM TOPICAL 2 TIMES DAILY
Qty: 30 G | Refills: 0 | Status: CANCELLED | OUTPATIENT
Start: 2021-08-31

## 2021-08-31 RX ORDER — DEXTROAMPHETAMINE SACCHARATE, AMPHETAMINE ASPARTATE, DEXTROAMPHETAMINE SULFATE AND AMPHETAMINE SULFATE 5; 5; 5; 5 MG/1; MG/1; MG/1; MG/1
20 TABLET ORAL 2 TIMES DAILY
Qty: 60 TABLET | Refills: 0 | Status: CANCELLED | OUTPATIENT
Start: 2021-08-31

## 2021-08-31 RX ORDER — CLINDAMYCIN PHOSPHATE 10 MG/G
GEL TOPICAL 2 TIMES DAILY
Qty: 1 ML | Refills: 1 | Status: SHIPPED | OUTPATIENT
Start: 2021-08-31 | End: 2022-08-12 | Stop reason: SDUPTHER

## 2021-08-31 RX ORDER — CLINDAMYCIN PHOSPHATE 10 MG/G
GEL TOPICAL 2 TIMES DAILY
Qty: 1 ML | Refills: 0 | Status: CANCELLED | OUTPATIENT
Start: 2021-08-31

## 2021-08-31 RX ORDER — DEXTROAMPHETAMINE SACCHARATE, AMPHETAMINE ASPARTATE, DEXTROAMPHETAMINE SULFATE AND AMPHETAMINE SULFATE 5; 5; 5; 5 MG/1; MG/1; MG/1; MG/1
20 TABLET ORAL DAILY
Qty: 30 TABLET | Refills: 0 | Status: SHIPPED | OUTPATIENT
Start: 2021-08-31 | End: 2022-01-07 | Stop reason: ALTCHOICE

## 2021-08-31 NOTE — PROGRESS NOTES
Merlene Rutledge (: 1965) is a 60 y.o. male, established patient, here for evaluation of the following chief complaint(s):  Physical (would like to talk about labs)     Written by Aida Felder, as dictated by Dr. Maria Gutierrez MD.      ASSESSMENT/PLAN:  Below is the assessment and plan developed based on review of pertinent history, physical exam, labs, studies, and medications. 1. Physical exam  Complete physical done today. Routine lab work reviewed and discussed today. Recommend that patient take a Vitamin B12 supplement daily. 2. Attention deficit hyperactivity disorder (ADHD), predominantly inattentive type  Continue taking Adderall 20 mg as prescribed. Ordered a drug screening to be completed. Waiting for results. -     COMPLIANCE DRUG SCREEN/PRESCRIPTION MONITORING; Future    3. Excoriated acne  Well controlled on current medications. Continue using clindamycin 1% and hydrocortisone 2.5% topical gels as prescribed. -     hydrocortisone (HYTONE) 2.5 % topical cream; Apply  to affected area two (2) times a day. use thin layer, Normal, Disp-30 g, R-1 sent to pharmacy. -     clindamycin (CLINDAGEL) 1 % topical gel; Apply  to affected area two (2) times a day. use thin film on affected area, Normal, Disp-1 mL, R-1 sent to pharmacy. SUBJECTIVE/OBJECTIVE:  HPI     Patient presents today for a complete physical exam. His most recent lab work on  revealed elevated LDL levels (101.8). He has been swimming for exercise. He notes he has been eating more red meat and fast food burgers. He notes the past 3 times he has eaten pepperoni pizza he has had bloating and upset stomach. He takes Adderall 20 mg for ADHD. He takes an OTC multivitamin and an iron supplement. He uses clindamycin 1% and hydrocortisone 2.5% topical gels for folliculitis. He notes occasional snoring. He has a previous COVID-19 infection and recovered well. He also received the COVID-19 vaccine. He notes seasonal allergies and he takes OTC Claritin. He completed a colonoscopy 2 years ago and was told to return in 10 years but he plans to go every 5 years. Patient Active Problem List   Diagnosis Code    Attention deficit hyperactivity disorder (ADHD) F90.9    Insomnia G47.00    Depression F32.9        Current Outpatient Medications on File Prior to Visit   Medication Sig Dispense Refill    dextroamphetamine-amphetamine (ADDERALL) 20 mg tablet Take 20 mg by mouth.  [DISCONTINUED] hydrocortisone (HYTONE) 2.5 % topical cream Apply  to affected area two (2) times a day. use thin layer      [DISCONTINUED] clindamycin (CLINDAGEL) 1 % topical gel Apply  to affected area two (2) times a day. use thin film on affected area 1 mL 0    aspirin 81 mg chewable tablet Take 81 mg by mouth daily. (Patient not taking: Reported on 8/31/2021)       No current facility-administered medications on file prior to visit.        No Known Allergies    Past Medical History:   Diagnosis Date    ADHD 9/28/2009    Depression 9/28/2009    Left inguinal hernia 9/22/2015    S/P left inguinal hernia repair 1/22/2016       Past Surgical History:   Procedure Laterality Date    HX HEENT  2012 2010    WISDOM TEETH REMOVED    HX HERNIA REPAIR Left 10/23/15    Inguinal w/mesh by Dr. Hai Marquez Right 12/2017    HX MOHS PROCEDURES  2010    HX OTHER SURGICAL  2010    QUAD TENDON RE-ATTACHED    HX OTHER SURGICAL Bilateral 07/26/2019    QUAD TENDON RE-ATTACHED       Family History   Problem Relation Age of Onset    Cancer Mother         Stomach Cancer diagnosed age 68    Cancer Maternal Grandfather     Hypertension Paternal Grandmother     Glaucoma Paternal Grandfather     Arthritis-osteo Father     Anesth Problems Neg Hx        Social History     Socioeconomic History    Marital status:      Spouse name: Not on file    Number of children: Not on file    Years of education: Not on file   Armida Brunner Highest education level: Not on file   Occupational History    Not on file   Tobacco Use    Smoking status: Never Smoker    Smokeless tobacco: Never Used   Vaping Use    Vaping Use: Never used   Substance and Sexual Activity    Alcohol use: No    Drug use: No    Sexual activity: Yes     Partners: Female   Other Topics Concern    Not on file   Social History Narrative    Not on file     Social Determinants of Health     Financial Resource Strain:     Difficulty of Paying Living Expenses:    Food Insecurity:     Worried About Running Out of Food in the Last Year:     Ran Out of Food in the Last Year:    Transportation Needs:     Lack of Transportation (Medical):  Lack of Transportation (Non-Medical):    Physical Activity:     Days of Exercise per Week:     Minutes of Exercise per Session:    Stress:     Feeling of Stress :    Social Connections:     Frequency of Communication with Friends and Family:     Frequency of Social Gatherings with Friends and Family:     Attends Anabaptist Services:     Active Member of Clubs or Organizations:     Attends Club or Organization Meetings:     Marital Status:    Intimate Partner Violence:     Fear of Current or Ex-Partner:     Emotionally Abused:     Physically Abused:     Sexually Abused:        Orders Only on 08/27/2021   Component Date Value Ref Range Status    TSH 08/27/2021 0.79  0.36 - 3.74 uIU/mL Final    Comment:   Due to TSH heterogeneity, both structurally and degree of glycosylation,  monoclonal antibodies used in the TSH assay may not accurately quantitate TSH. Therefore, this result should be correlated with clinical findings as well as  with other assessments of thyroid function, e.g., free T4, free T3.      Prostate Specific Ag 08/27/2021 1.7  0.01 - 4.0 ng/mL Final    Comment: Method used is [employer]  (NOTE)  Many types of test methods are used to measure PSA and can yield   different results on any given specimen. Therefore PSA results from   different laboratories on the same patient are not directly   comparable. In addition, PSA values by themselves should not be   interpreted as the presence or absence of cancer. PSA values used to   monitor for biochemical recurrence of prostate cancer should be   interpreted in accordance with current clinical guidelines (e.g. the   2013 American Urological Association (AUA) guidelines and the 2015    Association of Urology (EAU) guidelines).  Hep C virus Ab Interp. 08/27/2021 NONREACTIVE  NONREACTIVE   Final    Method used is Siemens Advia Tapingoaur    Cholesterol, total 08/27/2021 182  <200 MG/DL Final    Triglyceride 08/27/2021 106  <150 MG/DL Final    Comment: Based on NCEP-ATP III:  Triglycerides <150 mg/dL  is considered normal, 150-199  mg/dL  borderline high,  200-499 mg/dL high and  greater than or equal to 500  mg/dL very high.  HDL Cholesterol 08/27/2021 59  MG/DL Final    Comment: Based on NCEP ATP III, HDL Cholesterol <40 mg/dL is considered low and >60  mg/dL is elevated.       LDL, calculated 08/27/2021 101.8* 0 - 100 MG/DL Final    Comment: Based on the NCEP-ATP: LDL-C concentrations are considered  optimal <100 mg/dL,  near optimal/above Normal 100-129 mg/dL Borderline High: 130-159, High: 160-189  mg/dL Very High: Greater than or equal to 190 mg/dL      VLDL, calculated 08/27/2021 21.2  MG/DL Final    CHOL/HDL Ratio 08/27/2021 3.1  0.0 - 5.0   Final    Sodium 08/27/2021 141  136 - 145 mmol/L Final    Potassium 08/27/2021 4.3  3.5 - 5.1 mmol/L Final    Chloride 08/27/2021 112* 97 - 108 mmol/L Final    CO2 08/27/2021 28  21 - 32 mmol/L Final    Anion gap 08/27/2021 1* 5 - 15 mmol/L Final    Glucose 08/27/2021 89  65 - 100 mg/dL Final    BUN 08/27/2021 10  6 - 20 MG/DL Final    Creatinine 08/27/2021 0.81  0.70 - 1.30 MG/DL Final    BUN/Creatinine ratio 08/27/2021 12  12 - 20   Final    GFR est AA 08/27/2021 >60  >60 ml/min/1.73m2 Final    GFR est non-AA 08/27/2021 >60  >60 ml/min/1.73m2 Final    Comment: Estimated GFR is calculated using the IDMS-traceable Modification of Diet in  Renal Disease (MDRD) Study equation, reported for both  Americans  (GFRAA) and non- Americans (GFRNA), and normalized to 1.73m2 body  surface area. The physician must decide which value applies to the patient.  Calcium 08/27/2021 10.1  8.5 - 10.1 MG/DL Final    Bilirubin, total 08/27/2021 0.7  0.2 - 1.0 MG/DL Final    ALT (SGPT) 08/27/2021 27  12 - 78 U/L Final    AST (SGOT) 08/27/2021 18  15 - 37 U/L Final    Alk. phosphatase 08/27/2021 73  45 - 117 U/L Final    Protein, total 08/27/2021 7.0  6.4 - 8.2 g/dL Final    Albumin 08/27/2021 4.0  3.5 - 5.0 g/dL Final    Globulin 08/27/2021 3.0  2.0 - 4.0 g/dL Final    A-G Ratio 08/27/2021 1.3  1.1 - 2.2   Final    WBC 08/27/2021 4.9  4.1 - 11.1 K/uL Final    RBC 08/27/2021 5.98* 4.10 - 5.70 M/uL Final    HGB 08/27/2021 13.5  12.1 - 17.0 g/dL Final    HCT 08/27/2021 44.7  36.6 - 50.3 % Final    MCV 08/27/2021 74.7* 80.0 - 99.0 FL Final    MCH 08/27/2021 22.6* 26.0 - 34.0 PG Final    MCHC 08/27/2021 30.2  30.0 - 36.5 g/dL Final    RDW 08/27/2021 16.4* 11.5 - 14.5 % Final    PLATELET 27/61/6383 523  150 - 400 K/uL Final    MPV 08/27/2021 11.3  8.9 - 12.9 FL Final    NRBC 08/27/2021 0.0  0  WBC Final    ABSOLUTE NRBC 08/27/2021 0.00  0.00 - 0.01 K/uL Final    NEUTROPHILS 08/27/2021 65  32 - 75 % Final    LYMPHOCYTES 08/27/2021 25  12 - 49 % Final    MONOCYTES 08/27/2021 8  5 - 13 % Final    EOSINOPHILS 08/27/2021 1  0 - 7 % Final    BASOPHILS 08/27/2021 1  0 - 1 % Final    IMMATURE GRANULOCYTES 08/27/2021 0  0.0 - 0.5 % Final    ABS. NEUTROPHILS 08/27/2021 3.2  1.8 - 8.0 K/UL Final    ABS. LYMPHOCYTES 08/27/2021 1.2  0.8 - 3.5 K/UL Final    ABS. MONOCYTES 08/27/2021 0.4  0.0 - 1.0 K/UL Final    ABS. EOSINOPHILS 08/27/2021 0.1  0.0 - 0.4 K/UL Final    ABS.  BASOPHILS 08/27/2021 0.0  0.0 - 0.1 K/UL Final    ABS. IMM. GRANS. 08/27/2021 0.0  0.00 - 0.04 K/UL Final    DF 08/27/2021 AUTOMATED    Final       Review of Systems   Constitutional: Negative for activity change, fatigue and unexpected weight change. HENT: Negative for congestion, ear discharge, ear pain, hearing loss, rhinorrhea and sore throat. Eyes: Negative for pain, discharge and redness. Respiratory: Negative for cough, chest tightness and shortness of breath. Cardiovascular: Negative for leg swelling. Gastrointestinal: Positive for abdominal distention. Negative for abdominal pain, constipation and diarrhea. Endocrine: Negative for polyuria. Genitourinary: Negative for dysuria, flank pain and urgency. Musculoskeletal: Negative for arthralgias, back pain and myalgias. Skin: Negative for color change. Allergic/Immunologic: Positive for environmental allergies. Neurological: Negative for dizziness, light-headedness and headaches. Psychiatric/Behavioral: Negative for dysphoric mood and sleep disturbance. The patient is not nervous/anxious. Visit Vitals  /76 (BP 1 Location: Left arm)   Pulse 67   Temp 97.3 °F (36.3 °C)   Resp 15   Ht 6' (1.829 m)   Wt 185 lb 12.8 oz (84.3 kg)   SpO2 98%   BMI 25.20 kg/m²       Physical Exam  Vitals and nursing note reviewed. Constitutional:       General: He is not in acute distress. Appearance: Normal appearance. He is well-developed. He is not diaphoretic. HENT:      Head: Normocephalic and atraumatic. Right Ear: Tympanic membrane, ear canal and external ear normal. There is impacted cerumen. Left Ear: Tympanic membrane, ear canal and external ear normal. There is impacted cerumen. Mouth/Throat:      Mouth: Mucous membranes are moist.      Pharynx: Oropharynx is clear. Eyes:      General: No scleral icterus. Right eye: No discharge. Left eye: No discharge. Extraocular Movements: Extraocular movements intact. Conjunctiva/sclera: Conjunctivae normal.   Neck:      Thyroid: No thyromegaly. Cardiovascular:      Rate and Rhythm: Normal rate and regular rhythm. Pulses: Normal pulses. Dorsalis pedis pulses are 2+ on the right side and 2+ on the left side. Pulmonary:      Effort: Pulmonary effort is normal.      Breath sounds: Normal breath sounds. No wheezing. Abdominal:      General: Bowel sounds are normal. There is no distension. Palpations: Abdomen is soft. Tenderness: There is no abdominal tenderness. Musculoskeletal:      Cervical back: Normal range of motion and neck supple. Right lower leg: No edema. Left lower leg: No edema. Comments: B/L knees without crepitus   Lymphadenopathy:      Cervical: No cervical adenopathy. Neurological:      Deep Tendon Reflexes:      Reflex Scores:       Patellar reflexes are 2+ on the right side and 2+ on the left side. An electronic signature was used to authenticate this note.   -- Jn Roberts

## 2021-09-04 LAB — DRUGS UR: NORMAL

## 2022-01-07 ENCOUNTER — OFFICE VISIT (OUTPATIENT)
Dept: PRIMARY CARE CLINIC | Age: 57
End: 2022-01-07
Payer: COMMERCIAL

## 2022-01-07 VITALS
WEIGHT: 193.4 LBS | TEMPERATURE: 97.3 F | HEART RATE: 62 BPM | SYSTOLIC BLOOD PRESSURE: 116 MMHG | DIASTOLIC BLOOD PRESSURE: 76 MMHG | RESPIRATION RATE: 16 BRPM | HEIGHT: 72 IN | BODY MASS INDEX: 26.19 KG/M2 | OXYGEN SATURATION: 98 %

## 2022-01-07 DIAGNOSIS — F90.0 ATTENTION DEFICIT HYPERACTIVITY DISORDER (ADHD), PREDOMINANTLY INATTENTIVE TYPE: ICD-10-CM

## 2022-01-07 DIAGNOSIS — H61.23 BILATERAL IMPACTED CERUMEN: Primary | ICD-10-CM

## 2022-01-07 DIAGNOSIS — F51.01 PRIMARY INSOMNIA: ICD-10-CM

## 2022-01-07 PROCEDURE — 69210 REMOVE IMPACTED EAR WAX UNI: CPT | Performed by: INTERNAL MEDICINE

## 2022-01-07 PROCEDURE — 99213 OFFICE O/P EST LOW 20 MIN: CPT | Performed by: INTERNAL MEDICINE

## 2022-01-07 RX ORDER — DEXTROAMPHETAMINE SACCHARATE, AMPHETAMINE ASPARTATE, DEXTROAMPHETAMINE SULFATE AND AMPHETAMINE SULFATE 5; 5; 5; 5 MG/1; MG/1; MG/1; MG/1
20 TABLET ORAL DAILY
Qty: 30 TABLET | Refills: 0 | Status: CANCELLED | OUTPATIENT
Start: 2022-01-07

## 2022-01-07 RX ORDER — DEXTROAMPHETAMINE SACCHARATE, AMPHETAMINE ASPARTATE, DEXTROAMPHETAMINE SULFATE AND AMPHETAMINE SULFATE 5; 5; 5; 5 MG/1; MG/1; MG/1; MG/1
20 TABLET ORAL DAILY
Qty: 30 TABLET | Refills: 0 | Status: SHIPPED | OUTPATIENT
Start: 2022-01-07 | End: 2022-02-06

## 2022-01-07 NOTE — PROGRESS NOTES
Burgess Smith (: 1965) is a 60 y.o. male, established patient, here for evaluation of the following chief complaint(s):  Ear Fullness     Written by Armando Weber, as dictated by Dr. Jeff Donnelly MD.      ASSESSMENT/PLAN:  Below is the assessment and plan developed based on review of pertinent history, physical exam, labs, studies, and medications. 1. Bilateral impacted cerumen  Impacted cerumen removed in office today. He tolerated this procedure well with no complications. I provided him with instructions for an ear wash solution.   -     REMOVE IMPACTED EAR WAX    2. Attention deficit hyperactivity disorder (ADHD), predominantly inattentive type  His UDS on 21 was clean.  reviewed. I reviewed his Adderall at the current dose. -     dextroamphetamine-amphetamine (ADDERALL) 20 mg tablet; Take 1 Tablet by mouth daily for 30 days. Max Daily Amount: 20 mg., Normal, Disp-30 Tablet, R-0 sent to pharmacy. 3. Primary insomnia  Well controlled using rain sound quan. SUBJECTIVE/OBJECTIVE:  HPI  The patient presents today c/o bilateral ear fullness and needs Adderall refills He has been taking Adderall only weekdays for his work to stay focus. He is on Adderall 20 mg daily for ADHD. He had neuropsych testing with Dr. Gaston Davis in . He last had a UDS on 21. He has been using a rain sound quan on his phone which has been helping him sleep. Patient Active Problem List   Diagnosis Code    Attention deficit hyperactivity disorder (ADHD) F90.9    Insomnia G47.00        Current Outpatient Medications on File Prior to Visit   Medication Sig Dispense Refill    hydrocortisone (HYTONE) 2.5 % topical cream Apply  to affected area two (2) times a day. use thin layer 30 g 1    clindamycin (CLINDAGEL) 1 % topical gel Apply  to affected area two (2) times a day.  use thin film on affected area 1 mL 1    [DISCONTINUED] dextroamphetamine-amphetamine (ADDERALL) 20 mg tablet Take 1 Tablet by mouth daily. Max Daily Amount: 20 mg. 30 Tablet 0    aspirin 81 mg chewable tablet Take 81 mg by mouth daily. (Patient not taking: Reported on 8/31/2021)       No current facility-administered medications on file prior to visit. No Known Allergies    Past Medical History:   Diagnosis Date    ADHD 9/28/2009    Depression 9/28/2009    Left inguinal hernia 9/22/2015    S/P left inguinal hernia repair 1/22/2016       Past Surgical History:   Procedure Laterality Date    HX HEENT  2012 2010    WISDOM TEETH REMOVED    HX HERNIA REPAIR Left 10/23/15    Inguinal w/mesh by Dr. Basil Mariano Right 12/2017    HX MOHS PROCEDURES  2010    HX OTHER SURGICAL  2010    QUAD TENDON RE-ATTACHED    HX OTHER SURGICAL Bilateral 07/26/2019    QUAD TENDON RE-ATTACHED       Family History   Problem Relation Age of Onset    Cancer Mother         Stomach Cancer diagnosed age 68    Cancer Maternal Grandfather     Hypertension Paternal Grandmother     Glaucoma Paternal Grandfather     OSTEOARTHRITIS Father     Anesth Problems Neg Hx        Social History     Socioeconomic History    Marital status:      Spouse name: Not on file    Number of children: Not on file    Years of education: Not on file    Highest education level: Not on file   Occupational History    Not on file   Tobacco Use    Smoking status: Never Smoker    Smokeless tobacco: Never Used   Vaping Use    Vaping Use: Never used   Substance and Sexual Activity    Alcohol use: No    Drug use: No    Sexual activity: Yes     Partners: Female   Other Topics Concern    Not on file   Social History Narrative    Not on file       No visits with results within 3 Month(s) from this visit.    Latest known visit with results is:   Office Visit on 08/31/2021   Component Date Value Ref Range Status    Summary 08/31/2021 Note   Final    Comment: (NOTE)  ====================================================================  Compliance Drug Analysis, Ur  ====================================================================  Test                             Result       Flag       Units   NO DRUGS DETECTED.  ====================================================================  Test                      Result    Flag   Units      Ref Range   Creatinine              338              mg/dL      >=20  ====================================================================  Declared Medications:  Medication list was not provided.  ====================================================================  For clinical consultation, please call [phone].  ====================================================================  Performed At: [address] [place] [address] [facility]P  [address] [id number]  Juan Argueta PhD DA:[id number]        Review of Systems   Constitutional: Negative for activity change, fatigue and unexpected weight change. HENT: Negative for congestion, ear discharge, ear pain, hearing loss, rhinorrhea and sore throat.         +ear fullness   Eyes: Negative for pain, discharge and redness. Respiratory: Negative for cough, chest tightness and shortness of breath. Cardiovascular: Negative for leg swelling. Gastrointestinal: Negative for abdominal pain, constipation and diarrhea. Endocrine: Negative for polyuria. Genitourinary: Negative for dysuria, flank pain and urgency. Musculoskeletal: Negative for arthralgias, back pain and myalgias. Skin: Negative for color change. Neurological: Negative for dizziness, light-headedness and headaches. Psychiatric/Behavioral: Negative for dysphoric mood and sleep disturbance. The patient is not nervous/anxious. Visit Vitals  /76 (BP 1 Location: Left arm)   Pulse 62   Temp 97.3 °F (36.3 °C)   Resp 16   Ht 6' (1.829 m)   Wt 193 lb 6.4 oz (87.7 kg)   SpO2 98%   BMI 26.23 kg/m²      Physical Exam  Vitals and nursing note reviewed.    Constitutional:       General: He is not in acute distress. Appearance: Normal appearance. He is well-developed. He is not diaphoretic. HENT:      Head: Normocephalic and atraumatic. Right Ear: External ear normal. There is impacted cerumen. Left Ear: External ear normal. There is impacted cerumen. Mouth/Throat:      Mouth: Mucous membranes are moist.      Pharynx: Oropharynx is clear. Eyes:      General: No scleral icterus. Right eye: No discharge. Left eye: No discharge. Extraocular Movements: Extraocular movements intact. Conjunctiva/sclera: Conjunctivae normal.      Pupils: Pupils are equal, round, and reactive to light. Cardiovascular:      Rate and Rhythm: Normal rate and regular rhythm. Pulses: Normal pulses. Heart sounds: Normal heart sounds. Pulmonary:      Effort: Pulmonary effort is normal.      Breath sounds: Normal breath sounds. No wheezing. Musculoskeletal:      Right lower leg: No edema. Left lower leg: No edema. Neurological:      Mental Status: He is alert and oriented to person, place, and time. Psychiatric:         Mood and Affect: Mood and affect normal.       An electronic signature was used to authenticate this note.   -- Dominguez Giron

## 2022-01-07 NOTE — PROGRESS NOTES
Chief Complaint   Patient presents with    Ear Fullness       Visit Vitals  /76 (BP 1 Location: Left arm)   Pulse 62   Temp 97.3 °F (36.3 °C)   Resp 16   Ht 6' (1.829 m)   Wt 193 lb 6.4 oz (87.7 kg)   SpO2 98%   BMI 26.23 kg/m²       1. Have you been to the ER, urgent care clinic since your last visit? Hospitalized since your last visit? No    2. Have you seen or consulted any other health care providers outside of the [address] [place] [address] since your last visit? Include any pap smears or colon screening.  No

## 2022-01-13 ENCOUNTER — TELEPHONE (OUTPATIENT)
Dept: PRIMARY CARE CLINIC | Age: 57
End: 2022-01-13

## 2022-03-07 ENCOUNTER — OFFICE VISIT (OUTPATIENT)
Dept: PRIMARY CARE CLINIC | Age: 57
End: 2022-03-07
Payer: COMMERCIAL

## 2022-03-07 VITALS
WEIGHT: 193.6 LBS | SYSTOLIC BLOOD PRESSURE: 117 MMHG | HEIGHT: 72 IN | RESPIRATION RATE: 15 BRPM | DIASTOLIC BLOOD PRESSURE: 75 MMHG | BODY MASS INDEX: 26.22 KG/M2 | OXYGEN SATURATION: 97 % | HEART RATE: 60 BPM | TEMPERATURE: 97.1 F

## 2022-03-07 DIAGNOSIS — F90.0 ATTENTION DEFICIT HYPERACTIVITY DISORDER (ADHD), PREDOMINANTLY INATTENTIVE TYPE: ICD-10-CM

## 2022-03-07 DIAGNOSIS — L03.312 CELLULITIS OF BACK: Primary | ICD-10-CM

## 2022-03-07 DIAGNOSIS — F51.01 PRIMARY INSOMNIA: ICD-10-CM

## 2022-03-07 PROCEDURE — 99213 OFFICE O/P EST LOW 20 MIN: CPT | Performed by: INTERNAL MEDICINE

## 2022-03-07 RX ORDER — MUPIROCIN 20 MG/G
OINTMENT TOPICAL
COMMUNITY
Start: 2022-02-23

## 2022-03-07 RX ORDER — BISMUTH SUBSALICYLATE 262 MG
1 TABLET,CHEWABLE ORAL DAILY
COMMUNITY

## 2022-03-07 RX ORDER — DEXTROAMPHETAMINE SACCHARATE, AMPHETAMINE ASPARTATE, DEXTROAMPHETAMINE SULFATE AND AMPHETAMINE SULFATE 5; 5; 5; 5 MG/1; MG/1; MG/1; MG/1
20 TABLET ORAL DAILY
Qty: 30 TABLET | Refills: 0 | Status: SHIPPED | OUTPATIENT
Start: 2022-03-07 | End: 2022-04-06

## 2022-03-07 NOTE — PROGRESS NOTES
Chief Complaint   Patient presents with    Skin Problem       Visit Vitals  /75 (BP 1 Location: Right arm)   Pulse 60   Temp 97.1 °F (36.2 °C)   Resp 15   Ht 6' (1.829 m)   Wt 193 lb 9.6 oz (87.8 kg)   SpO2 97%   BMI 26.26 kg/m²       1. Have you been to the ER, urgent care clinic since your last visit? Hospitalized since your last visit? Yes Feb [facility]    2. Have you seen or consulted any other health care providers outside of the [address] [place] [address] since your last visit? Include any pap smears or colon screening.  No

## 2022-03-07 NOTE — PROGRESS NOTES
Nasrin Betancourt (: 1965) is a 60 y.o. male, established patient, here for evaluation of the following chief complaint(s):  Skin Problem     Written by Jessie Martínez, as dictated by Dr. Malinda Gonzalez MD.      ASSESSMENT/PLAN:  Below is the assessment and plan developed based on review of pertinent history, physical exam, labs, studies, and medications. 1. Cellulitis of back  On exam, the area is well healed with a scab formed. He does not need another round of antibiotics. 2. Primary insomnia  Well controlled on trazodone 100 mg QHS. Continue on current medication(s). 3. Attention deficit hyperactivity disorder (ADHD), predominantly inattentive type  I refilled his Adderall at the current dose. I have reviewed the patient's controlled substance prescription history through the Prescription Monitoring Program, so that the prescription(s) for a controlled substance can be given. -     dextroamphetamine-amphetamine (ADDERALL) 20 mg tablet; Take 1 Tablet by mouth daily for 30 days. Max Daily Amount: 20 mg., Normal, Disp-30 Tablet, R-0 sent to pharmacy. SUBJECTIVE/OBJECTIVE:  HPI   The patient presents today for an Adderall refill and c/o a \"pimple\" on his back that he first noticed on 22. He scratched at the area which caused a skin infection. He went to Urgent Care and was rx'd a course of Cephalexin and mupirocin 2% ointment. He has completed his course of antibiotics. He is on Adderall 20 mg for ADHD that he only takes on the weekdays at work to stay focused. He is on trazodone 100 mg QHS that has been helping with his sleep.      Patient Active Problem List   Diagnosis Code    Attention deficit hyperactivity disorder (ADHD) F90.9    Insomnia G47.00        Current Outpatient Medications on File Prior to Visit   Medication Sig Dispense Refill    mupirocin (BACTROBAN) 2 % ointment APPLY 1 APPLICATION TO THE SKIN 3 TIMES A DAY      multivitamin (ONE A DAY) tablet Take 1 Tablet by mouth daily.  hydrocortisone (HYTONE) 2.5 % topical cream Apply  to affected area two (2) times a day. use thin layer 30 g 1    clindamycin (CLINDAGEL) 1 % topical gel Apply  to affected area two (2) times a day. use thin film on affected area 1 mL 1    aspirin 81 mg chewable tablet Take 81 mg by mouth daily. (Patient not taking: Reported on 3/7/2022)       No current facility-administered medications on file prior to visit. No Known Allergies    Past Medical History:   Diagnosis Date    ADHD 9/28/2009    Depression 9/28/2009    Left inguinal hernia 9/22/2015    S/P left inguinal hernia repair 1/22/2016       Past Surgical History:   Procedure Laterality Date    HX HEENT  2012 2010    WISDOM TEETH REMOVED    HX HERNIA REPAIR Left 10/23/15    Inguinal w/mesh by Dr. Todd Sanchez Right 12/2017    HX MOHS PROCEDURES  2010    HX OTHER SURGICAL  2010    QUAD TENDON RE-ATTACHED    HX OTHER SURGICAL Bilateral 07/26/2019    QUAD TENDON RE-ATTACHED       Family History   Problem Relation Age of Onset    Cancer Mother         Stomach Cancer diagnosed age 68    Cancer Maternal Grandfather     Hypertension Paternal Grandmother     Glaucoma Paternal Grandfather     OSTEOARTHRITIS Father     Anesth Problems Neg Hx        Social History     Socioeconomic History    Marital status:      Spouse name: Not on file    Number of children: Not on file    Years of education: Not on file    Highest education level: Not on file   Occupational History    Not on file   Tobacco Use    Smoking status: Never Smoker    Smokeless tobacco: Never Used   Vaping Use    Vaping Use: Never used   Substance and Sexual Activity    Alcohol use: No    Drug use: No    Sexual activity: Yes     Partners: Female   Other Topics Concern    Not on file   Social History Narrative    Not on file       No visits with results within 3 Month(s) from this visit.    Latest known visit with results is: Office Visit on 08/31/2021   Component Date Value Ref Range Status    Summary 08/31/2021 Note   Final    Comment: (NOTE)  ====================================================================  Compliance Drug Analysis, Ur  ====================================================================  Test                             Result       Flag       Units   NO DRUGS DETECTED.  ====================================================================  Test                      Result    Flag   Units      Ref Range   Creatinine              338              mg/dL      >=20  ====================================================================  Declared Medications:  Medication list was not provided.  ====================================================================  For clinical consultation, please call [phone].  ====================================================================  Performed At: [facility]  [address] [place], [place] [id number]  Nicole Noel PhD HT:[id number]        Review of Systems   Constitutional: Negative for activity change, fatigue and unexpected weight change. HENT: Negative for congestion, ear discharge, ear pain, hearing loss, rhinorrhea and sore throat. Eyes: Negative for pain, discharge and redness. Respiratory: Negative for cough, chest tightness and shortness of breath. Cardiovascular: Negative for leg swelling. Gastrointestinal: Negative for abdominal pain, constipation and diarrhea. Endocrine: Negative for polyuria. Genitourinary: Negative for dysuria, flank pain and urgency. Musculoskeletal: Negative for arthralgias, back pain and myalgias. Skin: Positive for wound (skin infection on back). Negative for color change. Neurological: Negative for dizziness, light-headedness and headaches. Psychiatric/Behavioral: Negative for dysphoric mood and sleep disturbance. The patient is not nervous/anxious.       Visit Vitals  /75 (BP 1 Location: Right arm)   Pulse 60   Temp 97.1 °F (36.2 °C)   Resp 15   Ht 6' (1.829 m)   Wt 193 lb 9.6 oz (87.8 kg)   SpO2 97%   BMI 26.26 kg/m²      Physical Exam  Vitals and nursing note reviewed. Constitutional:       General: He is not in acute distress. Appearance: Normal appearance. He is well-developed. He is not diaphoretic. HENT:      Head: Normocephalic and atraumatic. Right Ear: External ear normal.      Left Ear: External ear normal.      Mouth/Throat:      Mouth: Mucous membranes are moist.      Pharynx: Oropharynx is clear. Eyes:      General: No scleral icterus. Right eye: No discharge. Left eye: No discharge. Extraocular Movements: Extraocular movements intact. Conjunctiva/sclera: Conjunctivae normal.      Pupils: Pupils are equal, round, and reactive to light. Cardiovascular:      Rate and Rhythm: Normal rate and regular rhythm. Pulses: Normal pulses. Heart sounds: Normal heart sounds. Pulmonary:      Effort: Pulmonary effort is normal.      Breath sounds: Normal breath sounds. No wheezing. Musculoskeletal:      Right lower leg: No edema. Left lower leg: No edema. Skin:     Findings: Lesion (closed scab on upper back that has healed well) present. Neurological:      Mental Status: He is alert and oriented to person, place, and time. Psychiatric:         Mood and Affect: Mood and affect normal.         An electronic signature was used to authenticate this note.   -- Jassi Wiggins

## 2022-08-12 DIAGNOSIS — L70.5 EXCORIATED ACNE: ICD-10-CM

## 2022-08-12 RX ORDER — CLINDAMYCIN PHOSPHATE 10 MG/G
GEL TOPICAL 2 TIMES DAILY
Qty: 1 ML | Refills: 1 | Status: SHIPPED | OUTPATIENT
Start: 2022-08-12 | End: 2022-10-15

## 2022-08-12 RX ORDER — HYDROCORTISONE 25 MG/G
CREAM TOPICAL 2 TIMES DAILY
Qty: 30 G | Refills: 1 | Status: SHIPPED | OUTPATIENT
Start: 2022-08-12 | End: 2022-10-15

## 2022-08-24 ENCOUNTER — TELEPHONE (OUTPATIENT)
Dept: PRIMARY CARE CLINIC | Age: 57
End: 2022-08-24

## 2022-08-24 DIAGNOSIS — R35.1 NOCTURIA MORE THAN TWICE PER NIGHT: ICD-10-CM

## 2022-08-24 DIAGNOSIS — E78.2 MIXED HYPERLIPIDEMIA: Primary | ICD-10-CM

## 2022-08-24 DIAGNOSIS — Z00.00 LABORATORY EXAMINATION ORDERED AS PART OF A COMPLETE PHYSICAL EXAMINATION: ICD-10-CM

## 2022-09-19 ENCOUNTER — OFFICE VISIT (OUTPATIENT)
Dept: PRIMARY CARE CLINIC | Age: 57
End: 2022-09-19
Payer: COMMERCIAL

## 2022-09-19 VITALS
OXYGEN SATURATION: 98 % | WEIGHT: 187.2 LBS | TEMPERATURE: 97.1 F | HEIGHT: 72 IN | DIASTOLIC BLOOD PRESSURE: 80 MMHG | BODY MASS INDEX: 25.35 KG/M2 | SYSTOLIC BLOOD PRESSURE: 120 MMHG | HEART RATE: 63 BPM | RESPIRATION RATE: 18 BRPM

## 2022-09-19 DIAGNOSIS — Z23 NEEDS FLU SHOT: ICD-10-CM

## 2022-09-19 DIAGNOSIS — R71.8 LOW MEAN CORPUSCULAR VOLUME (MCV): ICD-10-CM

## 2022-09-19 DIAGNOSIS — R35.1 NOCTURIA MORE THAN TWICE PER NIGHT: ICD-10-CM

## 2022-09-19 DIAGNOSIS — Z00.00 PHYSICAL EXAM: Primary | ICD-10-CM

## 2022-09-19 DIAGNOSIS — Z12.11 COLON CANCER SCREENING: ICD-10-CM

## 2022-09-19 DIAGNOSIS — F90.0 ATTENTION DEFICIT HYPERACTIVITY DISORDER (ADHD), PREDOMINANTLY INATTENTIVE TYPE: ICD-10-CM

## 2022-09-19 PROCEDURE — 90471 IMMUNIZATION ADMIN: CPT | Performed by: INTERNAL MEDICINE

## 2022-09-19 PROCEDURE — 99396 PREV VISIT EST AGE 40-64: CPT | Performed by: INTERNAL MEDICINE

## 2022-09-19 PROCEDURE — 90686 IIV4 VACC NO PRSV 0.5 ML IM: CPT | Performed by: INTERNAL MEDICINE

## 2022-09-19 RX ORDER — DEXTROAMPHETAMINE SACCHARATE, AMPHETAMINE ASPARTATE, DEXTROAMPHETAMINE SULFATE AND AMPHETAMINE SULFATE 5; 5; 5; 5 MG/1; MG/1; MG/1; MG/1
20 TABLET ORAL
COMMUNITY
End: 2022-09-25 | Stop reason: ALTCHOICE

## 2022-09-19 RX ORDER — DEXTROAMPHETAMINE SACCHARATE, AMPHETAMINE ASPARTATE, DEXTROAMPHETAMINE SULFATE AND AMPHETAMINE SULFATE 5; 5; 5; 5 MG/1; MG/1; MG/1; MG/1
20 TABLET ORAL
Status: CANCELLED | OUTPATIENT
Start: 2022-09-19

## 2022-09-19 NOTE — PROGRESS NOTES
Zackary Falcon (: 1965) is a 59 y.o. male, established patient, here for evaluation of the following chief complaint(s):  Physical     I, Fany Bills MD, personally performed the services described in this documentation as scribed by Fredy Hutchison in my presence, and it is both accurate and complete. ASSESSMENT/PLAN:  Below is the assessment and plan developed based on review of pertinent history, physical exam, labs, studies, and medications. 1. Physical exam  Physical exam done. Reviewed lab work. 2. Needs flu shot  Ordered influenza vaccine to be administered in office today. He tolerated this well. -     INFLUENZA, FLUARIX, FLULAVAL, FLUZONE (AGE 6 MO+), AFLURIA(AGE 3Y+) IM, PF, 0.5 ML    3. Attention deficit hyperactivity disorder (ADHD), predominantly inattentive type  Recheck compliance drug screen.   -     COMPLIANCE DRUG SCREEN/PRESCRIPTION MONITORING; Future    4. Colon cancer screening  Ordered FIT.   -     OCCULT BLOOD IMMUNOASSAY,DIAGNOSTIC; Future    5. Nocturia more than twice per night  Referred to Dr. Rachel Rosales (Urology). -     REFERRAL TO UROLOGY    6. Low mean corpuscular volume (MCV)  Ordered sickle cell screen. -     SICKLE CELL SCREEN; Future    SUBJECTIVE/OBJECTIVE:      The patient comes in today for a complete physical examination and to review lab work from 22. He was followed by [facility] previously for elevated PSA. He notes nocturia although he has been drinking more water. His las PSA has increased to 2.0. Denies lighter stream or changes in urination. Notes frequency seems correlated to amount of water he drinks. He notes FMHx of mother with esophageal cancer and his maternal grandfather with colon cancer. He is on Adderall 20 mg BID for ADHD. He notes regular BM. He is taking multi-vitamins with B12 of 100 iu. His MCV count has been historically low at roughly 76. He has started running for exercise.    Patient Active Problem List   Diagnosis Code    Attention deficit hyperactivity disorder (ADHD) F90.9    Insomnia G47.00        Current Outpatient Medications on File Prior to Visit   Medication Sig Dispense Refill    dextroamphetamine-amphetamine (AdderalL) 20 mg tablet Take 20 mg by mouth. clindamycin (CLINDAGEL) 1 % topical gel Apply  to affected area two (2) times a day. use thin film on affected area 1 mL 1    hydrocortisone (HYTONE) 2.5 % topical cream Apply  to affected area two (2) times a day. use thin layer 30 g 1    multivitamin (ONE A DAY) tablet Take 1 Tablet by mouth daily. mupirocin (BACTROBAN) 2 % ointment APPLY 1 APPLICATION TO THE SKIN 3 TIMES A DAY (Patient not taking: Reported on 9/19/2022)      [DISCONTINUED] aspirin 81 mg chewable tablet Take 81 mg by mouth daily. (Patient not taking: Reported on 3/7/2022)       No current facility-administered medications on file prior to visit.        No Known Allergies    Past Medical History:   Diagnosis Date    ADHD 9/28/2009    Depression 9/28/2009    Left inguinal hernia 9/22/2015    S/P left inguinal hernia repair 1/22/2016       Past Surgical History:   Procedure Laterality Date    HX HEENT  2012 2010    WISDOM TEETH REMOVED    HX HERNIA REPAIR Left 10/23/15    Inguinal w/mesh by Dr. Jarad Heath Right 12/2017    HX MOHS PROCEDURES  2010    HX OTHER SURGICAL  2010    QUAD TENDON RE-ATTACHED    HX OTHER SURGICAL Bilateral 07/26/2019    QUAD TENDON RE-ATTACHED       Family History   Problem Relation Age of Onset    Cancer Mother         Stomach Cancer diagnosed age 68    Cancer Maternal Grandfather     Hypertension Paternal Grandmother     Glaucoma Paternal Grandfather     OSTEOARTHRITIS Father     Anesth Problems Neg Hx        Social History     Socioeconomic History    Marital status:      Spouse name: Not on file    Number of children: Not on file    Years of education: Not on file    Highest education level: Not on file   Occupational History Not on file   Tobacco Use    Smoking status: Never    Smokeless tobacco: Never   Vaping Use    Vaping Use: Never used   Substance and Sexual Activity    Alcohol use: No    Drug use: No    Sexual activity: Yes     Partners: Female   Other Topics Concern    Not on file   Social History Narrative    Not on file        Review of Systems   Constitutional:  Negative for activity change, fatigue and unexpected weight change. HENT:  Negative for congestion, ear discharge, ear pain, hearing loss, rhinorrhea and sore throat. Eyes:  Negative for pain, discharge and redness. Respiratory:  Negative for cough, chest tightness and shortness of breath. Cardiovascular:  Negative for leg swelling. Gastrointestinal:  Negative for abdominal pain, constipation and diarrhea. Endocrine: Negative for polyuria. Genitourinary:  Negative for dysuria, flank pain and urgency. Musculoskeletal:  Negative for arthralgias, back pain and myalgias. Skin:  Negative for color change. Neurological:  Negative for dizziness, light-headedness and headaches. Psychiatric/Behavioral:  Negative for dysphoric mood and sleep disturbance. The patient is not nervous/anxious. Visit Vitals  /80 (BP 1 Location: Left upper arm, BP Patient Position: Sitting)   Pulse 63   Temp 97.1 °F (36.2 °C) (Temporal)   Resp 18   Ht 6' (1.829 m)   Wt 187 lb 3.2 oz (84.9 kg)   SpO2 98%   BMI 25.39 kg/m²      Physical Exam  Vitals and nursing note reviewed. Constitutional:       General: He is not in acute distress. Appearance: Normal appearance. He is well-developed and overweight. He is not diaphoretic. HENT:      Head: Normocephalic and atraumatic. Right Ear: Tympanic membrane, ear canal and external ear normal.      Left Ear: Tympanic membrane, ear canal and external ear normal.      Mouth/Throat:      Mouth: Mucous membranes are moist.      Pharynx: Oropharynx is clear. Eyes:      General: No scleral icterus.         Right eye: No discharge. Left eye: No discharge. Extraocular Movements: Extraocular movements intact. Conjunctiva/sclera: Conjunctivae normal.   Neck:      Thyroid: No thyromegaly. Cardiovascular:      Rate and Rhythm: Normal rate and regular rhythm. Pulses: Normal pulses. Dorsalis pedis pulses are 2+ on the right side and 2+ on the left side. Pulmonary:      Effort: Pulmonary effort is normal.      Breath sounds: Normal breath sounds. No wheezing. Abdominal:      General: Bowel sounds are normal. There is no distension. Palpations: Abdomen is soft. Tenderness: There is no abdominal tenderness. Musculoskeletal:      Cervical back: Normal range of motion and neck supple. Right lower leg: No edema. Left lower leg: No edema. Comments: B/L knees without crepitus   Lymphadenopathy:      Cervical: No cervical adenopathy. Neurological:      Deep Tendon Reflexes:      Reflex Scores:       Patellar reflexes are 2+ on the right side and 2+ on the left side. An electronic signature was used to authenticate this note.   -- [facility]

## 2022-09-19 NOTE — PROGRESS NOTES
1. \"Have you been to the ER, urgent care clinic since your last visit? Hospitalized since your last visit? \" No    2. \"Have you seen or consulted any other health care providers outside of the [address] [place] [address] since your last visit? \" No     3. For patients aged 39-70: Has the patient had a colonoscopy / FIT/ Cologuard?  Yes - no Care Gap present    Chief Complaint   Patient presents with    Physical

## 2022-09-25 DIAGNOSIS — F90.0 ATTENTION DEFICIT HYPERACTIVITY DISORDER (ADHD), PREDOMINANTLY INATTENTIVE TYPE: Primary | ICD-10-CM

## 2022-09-25 RX ORDER — DEXTROAMPHETAMINE SACCHARATE, AMPHETAMINE ASPARTATE MONOHYDRATE, DEXTROAMPHETAMINE SULFATE AND AMPHETAMINE SULFATE 5; 5; 5; 5 MG/1; MG/1; MG/1; MG/1
20 CAPSULE, EXTENDED RELEASE ORAL DAILY
Qty: 30 CAPSULE | Refills: 0 | Status: SHIPPED | OUTPATIENT
Start: 2022-09-25 | End: 2022-10-11 | Stop reason: ALTCHOICE

## 2022-09-27 DIAGNOSIS — Z12.11 COLON CANCER SCREENING: Primary | ICD-10-CM

## 2022-09-27 LAB — SPECIMEN STATUS REPORT, ROLRST: NORMAL

## 2022-10-05 LAB — HEMOCCULT STL QL IA: NEGATIVE

## 2022-10-11 DIAGNOSIS — F90.0 ATTENTION DEFICIT HYPERACTIVITY DISORDER (ADHD), PREDOMINANTLY INATTENTIVE TYPE: Primary | ICD-10-CM

## 2022-10-11 RX ORDER — DEXTROAMPHETAMINE SACCHARATE, AMPHETAMINE ASPARTATE, DEXTROAMPHETAMINE SULFATE AND AMPHETAMINE SULFATE 5; 5; 5; 5 MG/1; MG/1; MG/1; MG/1
20 TABLET ORAL DAILY
Qty: 30 TABLET | Refills: 0 | Status: SHIPPED | OUTPATIENT
Start: 2022-10-11 | End: 2022-11-10

## 2022-10-15 DIAGNOSIS — L70.5 EXCORIATED ACNE: ICD-10-CM

## 2022-10-15 RX ORDER — HYDROCORTISONE 25 MG/G
CREAM TOPICAL
Qty: 30 G | Refills: 1 | Status: SHIPPED | OUTPATIENT
Start: 2022-10-15

## 2022-10-15 RX ORDER — CLINDAMYCIN PHOSPHATE 10 MG/G
GEL TOPICAL
Qty: 30 G | Refills: 1 | Status: SHIPPED | OUTPATIENT
Start: 2022-10-15

## 2023-01-05 ENCOUNTER — TELEPHONE (OUTPATIENT)
Dept: PRIMARY CARE CLINIC | Age: 58
End: 2023-01-05

## 2023-01-05 NOTE — TELEPHONE ENCOUNTER
Patient wanted to have a virtual visit with Dr Mellissa Dupree tomorrow. I told him she was not available. He has coughing with green mucous.

## 2023-02-10 DIAGNOSIS — L70.5 EXCORIATED ACNE: ICD-10-CM

## 2023-02-13 RX ORDER — HYDROCORTISONE 25 MG/G
CREAM TOPICAL 2 TIMES DAILY
Qty: 30 G | Refills: 1 | Status: SHIPPED | OUTPATIENT
Start: 2023-02-13

## 2023-02-13 RX ORDER — CLINDAMYCIN PHOSPHATE 10 MG/G
GEL TOPICAL 2 TIMES DAILY
Qty: 30 G | Refills: 1 | Status: SHIPPED | OUTPATIENT
Start: 2023-02-13

## 2023-06-07 RX ORDER — CLINDAMYCIN PHOSPHATE 10 MG/G
GEL TOPICAL
Qty: 30 G | Refills: 1 | Status: SHIPPED | OUTPATIENT
Start: 2023-06-07

## 2023-09-12 ENCOUNTER — TELEPHONE (OUTPATIENT)
Dept: PRIMARY CARE CLINIC | Facility: CLINIC | Age: 58
End: 2023-09-12

## 2023-09-12 DIAGNOSIS — Z11.4 ENCOUNTER FOR SCREENING FOR HIV: Primary | ICD-10-CM

## 2023-09-12 DIAGNOSIS — Z00.00 LABORATORY EXAMINATION ORDERED AS PART OF A COMPLETE PHYSICAL EXAMINATION: ICD-10-CM

## 2023-09-12 NOTE — TELEPHONE ENCOUNTER
----- Message from Bowen Stephen sent at 9/12/2023 10:56 AM EDT -----  Subject: Message to Provider    QUESTIONS  Information for Provider? Pt wants labs ordered for upcoming appt   ---------------------------------------------------------------------------  --------------  Ania Guajardo INFO  [phone]; OK to leave message on voicemail  ---------------------------------------------------------------------------  --------------  SCRIPT ANSWERS  Relationship to Patient?  Self

## 2023-09-13 DIAGNOSIS — Z11.4 ENCOUNTER FOR SCREENING FOR HIV: ICD-10-CM

## 2023-09-13 DIAGNOSIS — Z00.00 LABORATORY EXAMINATION ORDERED AS PART OF A COMPLETE PHYSICAL EXAMINATION: ICD-10-CM

## 2023-09-13 LAB
ALBUMIN SERPL-MCNC: 3.7 G/DL (ref 3.5–5)
ALBUMIN/GLOB SERPL: 1.3 (ref 1.1–2.2)
ALP SERPL-CCNC: 72 U/L (ref 45–117)
ALT SERPL-CCNC: 25 U/L (ref 12–78)
ANION GAP SERPL CALC-SCNC: 1 MMOL/L (ref 5–15)
AST SERPL-CCNC: 19 U/L (ref 15–37)
BILIRUB SERPL-MCNC: 0.4 MG/DL (ref 0.2–1)
BUN SERPL-MCNC: 9 MG/DL (ref 6–20)
BUN/CREAT SERPL: 9 (ref 12–20)
CALCIUM SERPL-MCNC: 10.5 MG/DL (ref 8.5–10.1)
CHLORIDE SERPL-SCNC: 109 MMOL/L (ref 97–108)
CHOLEST SERPL-MCNC: 152 MG/DL
CO2 SERPL-SCNC: 30 MMOL/L (ref 21–32)
CREAT SERPL-MCNC: 0.97 MG/DL (ref 0.7–1.3)
ERYTHROCYTE [DISTWIDTH] IN BLOOD BY AUTOMATED COUNT: 16.8 % (ref 11.5–14.5)
GLOBULIN SER CALC-MCNC: 2.9 G/DL (ref 2–4)
GLUCOSE SERPL-MCNC: 89 MG/DL (ref 65–100)
HCT VFR BLD AUTO: 44.6 % (ref 36.6–50.3)
HDLC SERPL-MCNC: 50 MG/DL
HDLC SERPL: 3 (ref 0–5)
HGB BLD-MCNC: 13.4 G/DL (ref 12.1–17)
HIV 1+2 AB+HIV1 P24 AG SERPL QL IA: NONREACTIVE
HIV 1/2 RESULT COMMENT: NORMAL
LDLC SERPL CALC-MCNC: 77.2 MG/DL (ref 0–100)
MCH RBC QN AUTO: 22.5 PG (ref 26–34)
MCHC RBC AUTO-ENTMCNC: 30 G/DL (ref 30–36.5)
MCV RBC AUTO: 75 FL (ref 80–99)
NRBC # BLD: 0 K/UL (ref 0–0.01)
NRBC BLD-RTO: 0 PER 100 WBC
PLATELET # BLD AUTO: 188 K/UL (ref 150–400)
PMV BLD AUTO: 10.9 FL (ref 8.9–12.9)
POTASSIUM SERPL-SCNC: 4.3 MMOL/L (ref 3.5–5.1)
PROT SERPL-MCNC: 6.6 G/DL (ref 6.4–8.2)
PSA SERPL-MCNC: 2.3 NG/ML (ref 0.01–4)
RBC # BLD AUTO: 5.95 M/UL (ref 4.1–5.7)
SODIUM SERPL-SCNC: 140 MMOL/L (ref 136–145)
TRIGL SERPL-MCNC: 124 MG/DL
TSH SERPL DL<=0.05 MIU/L-ACNC: 0.75 UIU/ML (ref 0.36–3.74)
VLDLC SERPL CALC-MCNC: 24.8 MG/DL
WBC # BLD AUTO: 4.5 K/UL (ref 4.1–11.1)

## 2023-09-15 ENCOUNTER — TELEPHONE (OUTPATIENT)
Dept: PRIMARY CARE CLINIC | Facility: CLINIC | Age: 58
End: 2023-09-15

## 2023-09-15 ENCOUNTER — OFFICE VISIT (OUTPATIENT)
Dept: PRIMARY CARE CLINIC | Facility: CLINIC | Age: 58
End: 2023-09-15
Payer: COMMERCIAL

## 2023-09-15 VITALS
HEART RATE: 58 BPM | OXYGEN SATURATION: 99 % | RESPIRATION RATE: 16 BRPM | WEIGHT: 188 LBS | TEMPERATURE: 97.1 F | SYSTOLIC BLOOD PRESSURE: 104 MMHG | BODY MASS INDEX: 25.47 KG/M2 | DIASTOLIC BLOOD PRESSURE: 60 MMHG | HEIGHT: 72 IN

## 2023-09-15 DIAGNOSIS — M25.572 CHRONIC PAIN OF LEFT ANKLE: ICD-10-CM

## 2023-09-15 DIAGNOSIS — L70.5 EXCORIATED ACNE: ICD-10-CM

## 2023-09-15 DIAGNOSIS — Z12.11 COLON CANCER SCREENING: ICD-10-CM

## 2023-09-15 DIAGNOSIS — G89.29 CHRONIC PAIN OF LEFT ANKLE: ICD-10-CM

## 2023-09-15 DIAGNOSIS — Z00.00 PHYSICAL EXAM: Primary | ICD-10-CM

## 2023-09-15 DIAGNOSIS — F90.9 ATTENTION DEFICIT HYPERACTIVITY DISORDER (ADHD), UNSPECIFIED ADHD TYPE: ICD-10-CM

## 2023-09-15 PROCEDURE — 99396 PREV VISIT EST AGE 40-64: CPT | Performed by: INTERNAL MEDICINE

## 2023-09-15 RX ORDER — DEXTROAMPHETAMINE SACCHARATE, AMPHETAMINE ASPARTATE MONOHYDRATE, DEXTROAMPHETAMINE SULFATE AND AMPHETAMINE SULFATE 5; 5; 5; 5 MG/1; MG/1; MG/1; MG/1
60 CAPSULE, EXTENDED RELEASE ORAL EVERY MORNING
Qty: 30 CAPSULE | Refills: 0 | Status: CANCELLED | OUTPATIENT
Start: 2023-09-15 | End: 2023-10-15

## 2023-09-15 RX ORDER — DEXTROAMPHETAMINE SACCHARATE, AMPHETAMINE ASPARTATE MONOHYDRATE, DEXTROAMPHETAMINE SULFATE AND AMPHETAMINE SULFATE 5; 5; 5; 5 MG/1; MG/1; MG/1; MG/1
60 CAPSULE, EXTENDED RELEASE ORAL EVERY MORNING
COMMUNITY
End: 2023-09-15 | Stop reason: SDUPTHER

## 2023-09-15 RX ORDER — CLINDAMYCIN PHOSPHATE 10 MG/G
GEL TOPICAL
Qty: 30 G | Refills: 2 | Status: SHIPPED | OUTPATIENT
Start: 2023-09-15

## 2023-09-15 RX ORDER — CLINDAMYCIN PHOSPHATE 10 MG/G
GEL TOPICAL
Qty: 30 G | Refills: 1 | Status: CANCELLED | OUTPATIENT
Start: 2023-09-15

## 2023-09-15 RX ORDER — DEXTROAMPHETAMINE SACCHARATE, AMPHETAMINE ASPARTATE MONOHYDRATE, DEXTROAMPHETAMINE SULFATE AND AMPHETAMINE SULFATE 5; 5; 5; 5 MG/1; MG/1; MG/1; MG/1
60 CAPSULE, EXTENDED RELEASE ORAL EVERY MORNING
Qty: 30 CAPSULE | Refills: 0 | Status: SHIPPED | OUTPATIENT
Start: 2023-09-15 | End: 2023-10-16

## 2023-09-15 SDOH — ECONOMIC STABILITY: HOUSING INSECURITY
IN THE LAST 12 MONTHS, WAS THERE A TIME WHEN YOU DID NOT HAVE A STEADY PLACE TO SLEEP OR SLEPT IN A SHELTER (INCLUDING NOW)?: NO

## 2023-09-15 SDOH — ECONOMIC STABILITY: INCOME INSECURITY: HOW HARD IS IT FOR YOU TO PAY FOR THE VERY BASICS LIKE FOOD, HOUSING, MEDICAL CARE, AND HEATING?: NOT HARD AT ALL

## 2023-09-15 SDOH — ECONOMIC STABILITY: FOOD INSECURITY: WITHIN THE PAST 12 MONTHS, THE FOOD YOU BOUGHT JUST DIDN'T LAST AND YOU DIDN'T HAVE MONEY TO GET MORE.: NEVER TRUE

## 2023-09-15 SDOH — ECONOMIC STABILITY: FOOD INSECURITY: WITHIN THE PAST 12 MONTHS, YOU WORRIED THAT YOUR FOOD WOULD RUN OUT BEFORE YOU GOT MONEY TO BUY MORE.: NEVER TRUE

## 2023-09-15 ASSESSMENT — ENCOUNTER SYMPTOMS
ABDOMINAL PAIN: 0
DIARRHEA: 0
CONSTIPATION: 0
COUGH: 0
BACK PAIN: 0
RHINORRHEA: 0
CHEST TIGHTNESS: 0
COLOR CHANGE: 0
SHORTNESS OF BREATH: 0
EYE DISCHARGE: 0
SORE THROAT: 0

## 2023-09-15 ASSESSMENT — PATIENT HEALTH QUESTIONNAIRE - PHQ9
SUM OF ALL RESPONSES TO PHQ QUESTIONS 1-9: 0
1. LITTLE INTEREST OR PLEASURE IN DOING THINGS: 0
SUM OF ALL RESPONSES TO PHQ QUESTIONS 1-9: 0
2. FEELING DOWN, DEPRESSED OR HOPELESS: 0
SUM OF ALL RESPONSES TO PHQ9 QUESTIONS 1 & 2: 0

## 2023-09-15 NOTE — TELEPHONE ENCOUNTER
[id number] Sarah Robert [place] Junior [phone] [place] Masters [phone]    [facility] called about the patient adderall. Wants to make sure the XR is correct and the quantity?

## 2023-09-15 NOTE — TELEPHONE ENCOUNTER
Anson called to let Dr. Kendra Turk know that the patient did his labs but she cannot print the labels because the orders does not have any office information on them. Please put office information on the orders and put on Epic so they can print the labs out.

## 2023-09-18 ENCOUNTER — TELEPHONE (OUTPATIENT)
Dept: PRIMARY CARE CLINIC | Facility: CLINIC | Age: 58
End: 2023-09-18

## 2023-09-18 DIAGNOSIS — F90.9 ATTENTION DEFICIT HYPERACTIVITY DISORDER (ADHD), UNSPECIFIED ADHD TYPE: ICD-10-CM

## 2023-09-18 RX ORDER — DEXTROAMPHETAMINE SACCHARATE, AMPHETAMINE ASPARTATE MONOHYDRATE, DEXTROAMPHETAMINE SULFATE AND AMPHETAMINE SULFATE 5; 5; 5; 5 MG/1; MG/1; MG/1; MG/1
20 CAPSULE, EXTENDED RELEASE ORAL 2 TIMES DAILY
Qty: 60 CAPSULE | Refills: 0 | Status: SHIPPED | OUTPATIENT
Start: 2023-09-18 | End: 2023-10-18

## 2023-09-18 NOTE — TELEPHONE ENCOUNTER
Natalie Sinks from pharmacy called back requesting new script for Adderall 20mg with correct instructions.

## 2023-09-18 NOTE — TELEPHONE ENCOUNTER
Called and spoke with the pharmacist. Pharmacist stated that the prescription for ADDERALL 20 mg quantity is not correct if he is taking it as TID. She said the the XR is normally not used for three times a day. She looked at the last time that he received the medication which was in [place] on 2022. Prescription was sent for once daily. Previous not in 2022 stated the patient was taking it TWICE. Called the patient for further information- asked him how he is taking the medication, stated TID, asked him if he has had any other refills since SEPT, he said yes- asked who provided this, he said Dr Wilmar Manzanares  (nothing on file for this and pharmacy did not mention) Told him the pharmacist stated the ADDERALL XR is not normally used for TID use. He said it is not XR should be Immediate    I told him I need to speak with Dr Wilmar Manzanares, that there is a lot of discrepancy on how his has reported to be taking this. Quynh Hammonds

## 2023-09-18 NOTE — TELEPHONE ENCOUNTER
Called and spoke with the patient. I told him that we will only be doing 20 mg XR twice daily now. Not TID. Patient said okay. Still waiting on UDS. We will need to send a new prescription for correct instructions. Called to speak with the pharmacist- cancelled the previous incorrect scripts, will be send a new one electronically.

## 2023-09-18 NOTE — TELEPHONE ENCOUNTER
Called the pharmacy back    Per last office notes 2022 patient was taking- He is on Adderall 20 mg BID for ADHD    Most recent notes- patient reports to be taking Adderall at once TID 20 mg XR-  The pharmacist will need to call back, was on  a call

## 2023-09-18 NOTE — TELEPHONE ENCOUNTER
David Urbina with [address] [place] [address] called in reference to the generic medication for Adderall 20mg. She said she needs clarification on the prescription. Please call David Urbina back at Ph# [phone].

## 2023-09-19 ENCOUNTER — TELEPHONE (OUTPATIENT)
Dept: PRIMARY CARE CLINIC | Facility: CLINIC | Age: 58
End: 2023-09-19

## 2023-09-19 NOTE — TELEPHONE ENCOUNTER
Pharmacy is calling again about Adderall. He says the last prescription was for the ER20 and this one is for the IR. Is this correct?

## 2023-09-19 NOTE — TELEPHONE ENCOUNTER
Called the pharmacy- the correct prescription was sent. XR for full coverage per Physician and patient was aware of the change when I spoke with him yesterday      amphetamine-dextroamphetamine (ADDERALL XR) 20 MG extended release capsule 60 capsule 0 9/18/2023 10/18/2023    Sig - Route: Take 1 capsule by mouth in the morning and at bedtime for 30 days.  Max Daily Amount: 40 mg - Oral

## 2023-09-21 LAB — DRUGS UR: NORMAL

## 2023-10-11 LAB — HEMOCCULT STL QL IA: NEGATIVE

## 2023-10-13 ENCOUNTER — TELEPHONE (OUTPATIENT)
Dept: PRIMARY CARE CLINIC | Facility: CLINIC | Age: 58
End: 2023-10-13

## 2023-10-13 DIAGNOSIS — Z11.1 SCREENING-PULMONARY TB: ICD-10-CM

## 2023-10-13 DIAGNOSIS — Z11.1 SCREENING-PULMONARY TB: Primary | ICD-10-CM

## 2023-10-17 DIAGNOSIS — F90.9 ATTENTION DEFICIT HYPERACTIVITY DISORDER (ADHD), UNSPECIFIED ADHD TYPE: ICD-10-CM

## 2023-10-18 RX ORDER — DEXTROAMPHETAMINE SACCHARATE, AMPHETAMINE ASPARTATE MONOHYDRATE, DEXTROAMPHETAMINE SULFATE AND AMPHETAMINE SULFATE 5; 5; 5; 5 MG/1; MG/1; MG/1; MG/1
20 CAPSULE, EXTENDED RELEASE ORAL 2 TIMES DAILY
Qty: 60 CAPSULE | Refills: 0 | Status: SHIPPED | OUTPATIENT
Start: 2023-10-18 | End: 2023-11-17

## 2023-11-03 LAB
M TB IFN-G BLD-IMP: NEGATIVE
M TB IFN-G CD4+ T-CELLS BLD-ACNC: 0 IU/ML
M TBIFN-G CD4+ CD8+T-CELLS BLD-ACNC: 0 IU/ML
QUANTIFERON CRITERIA: NORMAL
QUANTIFERON MITOGEN VALUE: >10 IU/ML
QUANTIFERON NIL VALUE: 0 IU/ML

## 2023-11-06 ENCOUNTER — TELEPHONE (OUTPATIENT)
Dept: PRIMARY CARE CLINIC | Facility: CLINIC | Age: 58
End: 2023-11-06

## 2023-11-06 NOTE — TELEPHONE ENCOUNTER
Called pt back and he said he has fax number [phone] to [facility]. I told him I will fax it now and he said he will pick it up at the front.

## 2023-11-16 DIAGNOSIS — G89.29 CHRONIC SHOULDER PAIN, UNSPECIFIED LATERALITY: Primary | ICD-10-CM

## 2023-11-16 DIAGNOSIS — M25.519 CHRONIC SHOULDER PAIN, UNSPECIFIED LATERALITY: Primary | ICD-10-CM

## 2023-12-11 DIAGNOSIS — L70.5 EXCORIATED ACNE: ICD-10-CM

## 2023-12-11 RX ORDER — CLINDAMYCIN PHOSPHATE 10 MG/G
GEL TOPICAL
Qty: 30 G | Refills: 1 | Status: SHIPPED | OUTPATIENT
Start: 2023-12-11

## 2023-12-28 ENCOUNTER — TELEPHONE (OUTPATIENT)
Dept: PRIMARY CARE CLINIC | Facility: CLINIC | Age: 58
End: 2023-12-28

## 2023-12-28 NOTE — TELEPHONE ENCOUNTER
Patient calling to get refill of his adderall medication. Patient is stating that he doesn't want the extended release would like to go back to taking the medication twice a day. Please send new rx to [facility]/PHARMACY #[id number] - [place] - [address] - P [phone] - F [phone] [[id number]]

## 2024-02-28 DIAGNOSIS — F90.9 ATTENTION DEFICIT HYPERACTIVITY DISORDER (ADHD), UNSPECIFIED ADHD TYPE: ICD-10-CM

## 2024-02-28 RX ORDER — DEXTROAMPHETAMINE SACCHARATE, AMPHETAMINE ASPARTATE MONOHYDRATE, DEXTROAMPHETAMINE SULFATE AND AMPHETAMINE SULFATE 5; 5; 5; 5 MG/1; MG/1; MG/1; MG/1
20 CAPSULE, EXTENDED RELEASE ORAL 2 TIMES DAILY
Qty: 60 CAPSULE | Refills: 0 | Status: SHIPPED | OUTPATIENT
Start: 2024-02-28 | End: 2024-03-29

## 2024-02-28 NOTE — TELEPHONE ENCOUNTER
----- Message from Jessica Núñez sent at 2/28/2024 10:40 AM EST -----  Subject: Refill Request    QUESTIONS  Name of Medication? amphetamine-dextroamphetamine (ADDERALL XR) 20 MG   extended release capsule  Patient-reported dosage and instructions? 20 MG twice a day   How many days do you have left? 10  Preferred Pharmacy? [facility]/PHARMACY #[id number]  Pharmacy phone number (if available)? [phone]  Additional Information for Provider? Patient does not want the XR. Patient   does not like the XR and would just like the 20 MG twice a day. Please   contact patient to further assist.   ---------------------------------------------------------------------------  --------------  CALL BACK INFO  What is the best way for the office to contact you? OK to leave message on   voicemail  Preferred Call Back Phone Number? [phone]  ---------------------------------------------------------------------------  --------------  SCRIPT ANSWERS  Relationship to Patient? Self

## 2024-08-16 DIAGNOSIS — Z12.11 COLON CANCER SCREENING: Primary | ICD-10-CM

## 2024-08-26 ENCOUNTER — TELEPHONE (OUTPATIENT)
Dept: PRIMARY CARE CLINIC | Facility: CLINIC | Age: 59
End: 2024-08-26

## 2024-08-26 NOTE — TELEPHONE ENCOUNTER
Patient needs letter for his employer for his use of adderall. He had random drug screening at work and medication showed in his results. Patient needs letter email to him by 2pm today to    [email or web address]   Please call patient with any questions or concerns.

## 2024-09-16 SDOH — ECONOMIC STABILITY: FOOD INSECURITY: WITHIN THE PAST 12 MONTHS, THE FOOD YOU BOUGHT JUST DIDN'T LAST AND YOU DIDN'T HAVE MONEY TO GET MORE.: SOMETIMES TRUE

## 2024-09-16 SDOH — ECONOMIC STABILITY: INCOME INSECURITY: HOW HARD IS IT FOR YOU TO PAY FOR THE VERY BASICS LIKE FOOD, HOUSING, MEDICAL CARE, AND HEATING?: SOMEWHAT HARD

## 2024-09-16 SDOH — ECONOMIC STABILITY: FOOD INSECURITY: WITHIN THE PAST 12 MONTHS, YOU WORRIED THAT YOUR FOOD WOULD RUN OUT BEFORE YOU GOT MONEY TO BUY MORE.: SOMETIMES TRUE

## 2024-09-16 SDOH — ECONOMIC STABILITY: TRANSPORTATION INSECURITY
IN THE PAST 12 MONTHS, HAS LACK OF TRANSPORTATION KEPT YOU FROM MEETINGS, WORK, OR FROM GETTING THINGS NEEDED FOR DAILY LIVING?: NO

## 2024-09-17 ENCOUNTER — OFFICE VISIT (OUTPATIENT)
Dept: PRIMARY CARE CLINIC | Facility: CLINIC | Age: 59
End: 2024-09-17

## 2024-09-17 VITALS
HEART RATE: 61 BPM | RESPIRATION RATE: 16 BRPM | OXYGEN SATURATION: 97 % | TEMPERATURE: 97.5 F | DIASTOLIC BLOOD PRESSURE: 69 MMHG | SYSTOLIC BLOOD PRESSURE: 104 MMHG | HEIGHT: 72 IN | WEIGHT: 184.4 LBS | BODY MASS INDEX: 24.98 KG/M2

## 2024-09-17 DIAGNOSIS — F90.9 ATTENTION DEFICIT HYPERACTIVITY DISORDER (ADHD), UNSPECIFIED ADHD TYPE: ICD-10-CM

## 2024-09-17 DIAGNOSIS — Z00.00 PHYSICAL EXAM: ICD-10-CM

## 2024-09-17 DIAGNOSIS — L70.5 EXCORIATED ACNE: ICD-10-CM

## 2024-09-17 DIAGNOSIS — Z23 ENCOUNTER FOR IMMUNIZATION: ICD-10-CM

## 2024-09-17 DIAGNOSIS — Z12.11 COLON CANCER SCREENING: ICD-10-CM

## 2024-09-17 DIAGNOSIS — Z00.00 PHYSICAL EXAM: Primary | ICD-10-CM

## 2024-09-17 LAB
COMMENT:: NORMAL
SPECIMEN HOLD: NORMAL

## 2024-09-17 RX ORDER — HYDROCORTISONE 25 MG/G
CREAM TOPICAL 2 TIMES DAILY
Qty: 1 EACH | Refills: 2 | Status: CANCELLED | OUTPATIENT
Start: 2024-09-17

## 2024-09-17 RX ORDER — HYDROCORTISONE 2.5 %
CREAM (GRAM) TOPICAL 2 TIMES DAILY
Qty: 1 EACH | Refills: 2 | Status: SHIPPED | OUTPATIENT
Start: 2024-09-17

## 2024-09-17 RX ORDER — CLINDAMYCIN PHOSPHATE 10 MG/G
GEL TOPICAL
Qty: 30 G | Refills: 2 | Status: CANCELLED | OUTPATIENT
Start: 2024-09-17

## 2024-09-17 RX ORDER — DEXTROAMPHETAMINE SACCHARATE, AMPHETAMINE ASPARTATE MONOHYDRATE, DEXTROAMPHETAMINE SULFATE AND AMPHETAMINE SULFATE 5; 5; 5; 5 MG/1; MG/1; MG/1; MG/1
20 CAPSULE, EXTENDED RELEASE ORAL 2 TIMES DAILY
Qty: 60 CAPSULE | Refills: 0 | Status: CANCELLED | OUTPATIENT
Start: 2024-09-17 | End: 2024-10-17

## 2024-09-17 RX ORDER — CLINDAMYCIN PHOSPHATE 10 MG/G
GEL TOPICAL
Qty: 30 G | Refills: 1 | Status: SHIPPED | OUTPATIENT
Start: 2024-09-17

## 2024-09-17 ASSESSMENT — PATIENT HEALTH QUESTIONNAIRE - PHQ9
SUM OF ALL RESPONSES TO PHQ9 QUESTIONS 1 & 2: 0
SUM OF ALL RESPONSES TO PHQ QUESTIONS 1-9: 0
1. LITTLE INTEREST OR PLEASURE IN DOING THINGS: NOT AT ALL
2. FEELING DOWN, DEPRESSED OR HOPELESS: NOT AT ALL
SUM OF ALL RESPONSES TO PHQ QUESTIONS 1-9: 0

## 2024-09-17 ASSESSMENT — ENCOUNTER SYMPTOMS
SHORTNESS OF BREATH: 0
COUGH: 0
EYE DISCHARGE: 0
BACK PAIN: 0
CONSTIPATION: 0
COLOR CHANGE: 0
SORE THROAT: 0
RHINORRHEA: 0
CHEST TIGHTNESS: 0
ABDOMINAL PAIN: 0
DIARRHEA: 0

## 2024-09-18 LAB
ALBUMIN SERPL-MCNC: 4 G/DL (ref 3.5–5)
ALBUMIN/GLOB SERPL: 1.4 (ref 1.1–2.2)
ALP SERPL-CCNC: 80 U/L (ref 45–117)
ALT SERPL-CCNC: 21 U/L (ref 12–78)
ANION GAP SERPL CALC-SCNC: 1 MMOL/L (ref 2–12)
AST SERPL-CCNC: 18 U/L (ref 15–37)
BILIRUB SERPL-MCNC: 0.5 MG/DL (ref 0.2–1)
BUN SERPL-MCNC: 11 MG/DL (ref 6–20)
BUN/CREAT SERPL: 11 (ref 12–20)
CALCIUM SERPL-MCNC: 10 MG/DL (ref 8.5–10.1)
CHLORIDE SERPL-SCNC: 109 MMOL/L (ref 97–108)
CHOLEST SERPL-MCNC: 144 MG/DL
CO2 SERPL-SCNC: 30 MMOL/L (ref 21–32)
CREAT SERPL-MCNC: 1.01 MG/DL (ref 0.7–1.3)
ERYTHROCYTE [DISTWIDTH] IN BLOOD BY AUTOMATED COUNT: 16.6 % (ref 11.5–14.5)
GLOBULIN SER CALC-MCNC: 2.8 G/DL (ref 2–4)
GLUCOSE SERPL-MCNC: 84 MG/DL (ref 65–100)
HCT VFR BLD AUTO: 43 % (ref 36.6–50.3)
HDLC SERPL-MCNC: 49 MG/DL
HDLC SERPL: 2.9 (ref 0–5)
HGB BLD-MCNC: 13.1 G/DL (ref 12.1–17)
LDLC SERPL CALC-MCNC: 73 MG/DL (ref 0–100)
MCH RBC QN AUTO: 22.7 PG (ref 26–34)
MCHC RBC AUTO-ENTMCNC: 30.5 G/DL (ref 30–36.5)
MCV RBC AUTO: 74.4 FL (ref 80–99)
NRBC # BLD: 0 K/UL (ref 0–0.01)
NRBC BLD-RTO: 0 PER 100 WBC
PLATELET # BLD AUTO: 188 K/UL (ref 150–400)
PMV BLD AUTO: 11.5 FL (ref 8.9–12.9)
POTASSIUM SERPL-SCNC: 4.1 MMOL/L (ref 3.5–5.1)
PROT SERPL-MCNC: 6.8 G/DL (ref 6.4–8.2)
PSA SERPL-MCNC: 2.6 NG/ML (ref 0.01–4)
RBC # BLD AUTO: 5.78 M/UL (ref 4.1–5.7)
SODIUM SERPL-SCNC: 140 MMOL/L (ref 136–145)
TRIGL SERPL-MCNC: 110 MG/DL
TSH SERPL DL<=0.05 MIU/L-ACNC: 0.53 UIU/ML (ref 0.36–3.74)
VLDLC SERPL CALC-MCNC: 22 MG/DL
WBC # BLD AUTO: 6.1 K/UL (ref 4.1–11.1)

## 2024-09-20 ENCOUNTER — PREP FOR PROCEDURE (OUTPATIENT)
Age: 59
End: 2024-09-20

## 2024-09-20 DIAGNOSIS — Z12.11 COLON CANCER SCREENING: ICD-10-CM

## 2024-10-20 PROBLEM — Z12.11 COLON CANCER SCREENING: Status: RESOLVED | Noted: 2024-09-20 | Resolved: 2024-10-20

## 2024-10-23 ENCOUNTER — TELEPHONE (OUTPATIENT)
Age: 59
End: 2024-10-23

## 2024-10-23 RX ORDER — POLYETHYLENE GLYCOL 3350, SODIUM SULFATE ANHYDROUS, SODIUM BICARBONATE, SODIUM CHLORIDE, POTASSIUM CHLORIDE 236; 22.74; 6.74; 5.86; 2.97 G/4L; G/4L; G/4L; G/4L; G/4L
1 POWDER, FOR SOLUTION ORAL ONCE
Qty: 4000 ML | Refills: 0 | Status: SHIPPED | OUTPATIENT
Start: 2024-10-23 | End: 2024-10-23

## 2024-10-23 NOTE — TELEPHONE ENCOUNTER
Patient called and is wanting prep for colonoscopy called in, scheduled for 11/19, will be sending letter with prep instructions

## 2024-10-24 ENCOUNTER — TELEPHONE (OUTPATIENT)
Age: 59
End: 2024-10-24

## 2024-10-24 RX ORDER — POLYETHYLENE GLYCOL 3350, SODIUM SULFATE ANHYDROUS, SODIUM BICARBONATE, SODIUM CHLORIDE, POTASSIUM CHLORIDE 236; 22.74; 6.74; 5.86; 2.97 G/4L; G/4L; G/4L; G/4L; G/4L
4 POWDER, FOR SOLUTION ORAL ONCE
Qty: 4000 ML | Refills: 0 | Status: SHIPPED | OUTPATIENT
Start: 2024-10-24 | End: 2024-10-24

## 2024-10-29 ENCOUNTER — TELEPHONE (OUTPATIENT)
Age: 59
End: 2024-10-29

## 2024-10-29 NOTE — TELEPHONE ENCOUNTER
I called to give the patient instructions for the [facility] bowel prep. I asked him if he had received the instructions in the mail. He stated, \"I am currently traveling, is there anyway the instructions can me emailed to me?\" I told him I will check with one of the staff and ask them to email you the instructions. If you have any questions please call. He acknowledged understanding and thanked me for the call.

## 2024-10-30 NOTE — TELEPHONE ENCOUNTER
Prep instruction emailed to patient at [email or web address] per patient request from fax machine.  Message left for patient to call to verify instructions have been received.

## 2024-11-19 ENCOUNTER — ANESTHESIA (OUTPATIENT)
Facility: HOSPITAL | Age: 59
End: 2024-11-19
Payer: COMMERCIAL

## 2024-11-19 ENCOUNTER — ANESTHESIA EVENT (OUTPATIENT)
Facility: HOSPITAL | Age: 59
End: 2024-11-19
Payer: COMMERCIAL

## 2024-11-19 ENCOUNTER — HOSPITAL ENCOUNTER (OUTPATIENT)
Facility: HOSPITAL | Age: 59
Setting detail: OUTPATIENT SURGERY
Discharge: HOME | End: 2024-11-19
Attending: SURGERY | Admitting: SURGERY
Payer: COMMERCIAL

## 2024-11-19 VITALS
BODY MASS INDEX: 24.38 KG/M2 | TEMPERATURE: 97.8 F | HEIGHT: 72 IN | HEART RATE: 63 BPM | DIASTOLIC BLOOD PRESSURE: 76 MMHG | RESPIRATION RATE: 17 BRPM | OXYGEN SATURATION: 99 % | SYSTOLIC BLOOD PRESSURE: 110 MMHG | WEIGHT: 180 LBS

## 2024-11-19 PROCEDURE — 3700000001 HC ADD 15 MINUTES (ANESTHESIA): Performed by: SURGERY

## 2024-11-19 PROCEDURE — 45378 DIAGNOSTIC COLONOSCOPY: CPT | Performed by: SURGERY

## 2024-11-19 PROCEDURE — 3700000000 HC ANESTHESIA ATTENDED CARE: Performed by: SURGERY

## 2024-11-19 PROCEDURE — 2500000003 HC RX 250 WO HCPCS: Performed by: NURSE ANESTHETIST, CERTIFIED REGISTERED

## 2024-11-19 PROCEDURE — 7100000011 HC PHASE II RECOVERY - ADDTL 15 MIN: Performed by: SURGERY

## 2024-11-19 PROCEDURE — 3600007512: Performed by: SURGERY

## 2024-11-19 PROCEDURE — 6360000002 HC RX W HCPCS: Performed by: NURSE ANESTHETIST, CERTIFIED REGISTERED

## 2024-11-19 PROCEDURE — 7100000010 HC PHASE II RECOVERY - FIRST 15 MIN: Performed by: SURGERY

## 2024-11-19 PROCEDURE — 3600007502: Performed by: SURGERY

## 2024-11-19 PROCEDURE — 2709999900 HC NON-CHARGEABLE SUPPLY: Performed by: SURGERY

## 2024-11-19 RX ORDER — ASPIRIN 81 MG/1
81 TABLET, CHEWABLE ORAL DAILY
COMMUNITY

## 2024-11-19 RX ORDER — SODIUM CHLORIDE 0.9 % (FLUSH) 0.9 %
5-40 SYRINGE (ML) INJECTION PRN
Status: DISCONTINUED | OUTPATIENT
Start: 2024-11-19 | End: 2024-11-19 | Stop reason: HOSPADM

## 2024-11-19 RX ORDER — SODIUM CHLORIDE 9 MG/ML
INJECTION, SOLUTION INTRAVENOUS CONTINUOUS
Status: DISCONTINUED | OUTPATIENT
Start: 2024-11-19 | End: 2024-11-19 | Stop reason: HOSPADM

## 2024-11-19 RX ORDER — SODIUM CHLORIDE 0.9 % (FLUSH) 0.9 %
5-40 SYRINGE (ML) INJECTION EVERY 12 HOURS SCHEDULED
Status: DISCONTINUED | OUTPATIENT
Start: 2024-11-19 | End: 2024-11-19 | Stop reason: HOSPADM

## 2024-11-19 RX ORDER — LIDOCAINE HYDROCHLORIDE 20 MG/ML
INJECTION, SOLUTION EPIDURAL; INFILTRATION; INTRACAUDAL; PERINEURAL
Status: DISCONTINUED | OUTPATIENT
Start: 2024-11-19 | End: 2024-11-19 | Stop reason: SDUPTHER

## 2024-11-19 RX ORDER — SODIUM CHLORIDE 9 MG/ML
INJECTION, SOLUTION INTRAVENOUS PRN
Status: DISCONTINUED | OUTPATIENT
Start: 2024-11-19 | End: 2024-11-19 | Stop reason: HOSPADM

## 2024-11-19 RX ADMIN — LIDOCAINE HYDROCHLORIDE 50 MG: 20 INJECTION, SOLUTION EPIDURAL; INFILTRATION; INTRACAUDAL; PERINEURAL at 16:04

## 2024-11-19 RX ADMIN — PROPOFOL 40 MG: 10 INJECTION, EMULSION INTRAVENOUS at 16:11

## 2024-11-19 RX ADMIN — PROPOFOL 110 MG: 10 INJECTION, EMULSION INTRAVENOUS at 16:04

## 2024-11-19 RX ADMIN — PROPOFOL 30 MG: 10 INJECTION, EMULSION INTRAVENOUS at 16:05

## 2024-11-19 RX ADMIN — PROPOFOL 40 MG: 10 INJECTION, EMULSION INTRAVENOUS at 16:07

## 2024-11-19 RX ADMIN — PROPOFOL 40 MG: 10 INJECTION, EMULSION INTRAVENOUS at 16:09

## 2024-11-19 RX ADMIN — PROPOFOL 40 MG: 10 INJECTION, EMULSION INTRAVENOUS at 16:08

## 2024-11-19 RX ADMIN — PROPOFOL 40 MG: 10 INJECTION, EMULSION INTRAVENOUS at 16:13

## 2024-11-19 RX ADMIN — PROPOFOL 20 MG: 10 INJECTION, EMULSION INTRAVENOUS at 16:06

## 2024-11-19 NOTE — ANESTHESIA PRE PROCEDURE
Department of Anesthesiology  Preprocedure Note       Name:  Jame Baker   Age:  59 y.o.  :  1965                                          MRN:  [id number]         Date:  2024      Surgeon: Surgeon(s):  Xochitl Whyte Jr., MD    Procedure: Procedure(s):  COLONOSCOPY    Medications prior to admission:   Prior to Admission medications    Medication Sig Start Date End Date Taking? Authorizing Provider   aspirin 81 MG chewable tablet Take 1 tablet by mouth daily    Provider, MD Nelida   clindamycin 1 % gel APPLY A THIN FILM TOPICALLY TO AFFECTED AREA(S) TWO TIMES DAILY 24   Megan Macias MD   hydrocortisone 2.5 % cream Apply topically 2 times daily 24   Megan Macias MD   amphetamine-dextroamphetamine (ADDERALL XR) 20 MG extended release capsule Take 1 capsule by mouth in the morning and at bedtime for 30 days. Max Daily Amount: 40 mg 24  Megan Macias MD       Current medications:    Current Facility-Administered Medications   Medication Dose Route Frequency Provider Last Rate Last Admin   • 0.9 % sodium chloride infusion   IntraVENous Continuous Xochitl Whyte Jr., MD       • sodium chloride flush 0.9 % injection 5-40 mL  5-40 mL IntraVENous 2 times per day Xochitl Whyte Jr., MD       • sodium chloride flush 0.9 % injection 5-40 mL  5-40 mL IntraVENous PRN Xochitl Whyte Jr., MD       • 0.9 % sodium chloride infusion   IntraVENous PRN Xochitl Whyte Jr., MD           Allergies:  No Known Allergies    Problem List:    Patient Active Problem List   Diagnosis Code   • Insomnia G47.00   • Attention deficit hyperactivity disorder (ADHD) F90.9       Past Medical History:        Diagnosis Date   • Depression 2009   • Left inguinal hernia 2015   • S/P left inguinal hernia repair 2016       Past Surgical History:        Procedure Laterality Date   • HEENT  2012    WISDOM TEETH REMOVED   • HERNIA REPAIR Right 2017   • HERNIA

## 2024-11-19 NOTE — OP NOTE
Colonoscopy Procedure Note      Indications:    Screening colonoscopy     :  PREMA GOMEZ JR, MD    Staff: Circulator: Karson Ross RN  Endoscopy Technician: Alex Lazo     Implants: none    Referring Provider: Megan Macias MD    Sedation: MAC    Procedure Details:  After informed consent was obtained with all risks and benefits of procedure explained and preoperative exam completed, the patient was taken to the endoscopy suite and placed in the left lateral decubitus position.  Upon sequential sedation as per above, a digital rectal exam was performed  And was normal.  The Olympus videocolonoscope  was inserted in the rectum and carefully advanced to the cecum, which was identified by the ileocecal valve and appendiceal orifice.  The quality of preparation was excellent.  The colonoscope was slowly withdrawn with careful evaluation between folds. Retroflexion in the rectum was performed and was normal..     Colon Findings:   Rectum: normal  Sigmoid: normal  Descending Colon: normal  Transverse Colon: normal  Ascending Colon: normal  Cecum: normal      Therapies:  none    Complications:   None; patient tolerated the procedure well.           Impression:    See Postoperative diagnosis above    Recommendations:  -Repeat colonoscopy in 5 years.   Resume normal medication(s).     Interventions:  none    Complications: None.     Specimen Removed:  * No specimens in log *    EBL:  None.    Discharge Disposition:  Home in the company of a  when able to ambulate.    PREMA GOMEZ JR, MD  11/19/2024  4:20 PM

## 2024-11-19 NOTE — ANESTHESIA POSTPROCEDURE EVALUATION
Department of Anesthesiology  Postprocedure Note    Patient: Jame Baker  MRN: [id number]  YOB: 1965  Date of evaluation: 11/19/2024    Procedure Summary       Date: 11/19/24 Room / Location: [facility] / [facility] ENDOSCOPY    Anesthesia Start: 1557 Anesthesia Stop: 1621    Procedure: COLONOSCOPY Diagnosis:       Colon cancer screening      (Colon cancer screening [Z12.11])    Surgeons: Xochitl Whyte Jr., MD Responsible Provider: Holley Stevens DO    Anesthesia Type: MAC ASA Status: 2            Anesthesia Type: MAC    Gabe Phase I: Gabe Score: 10    Gabe Phase II: Gabe Score: 10    Anesthesia Post Evaluation    Patient location during evaluation: PACU  Patient participation: complete - patient participated  Level of consciousness: awake and alert  Pain score: 0  Airway patency: patent  Nausea & Vomiting: no nausea and no vomiting  Cardiovascular status: blood pressure returned to baseline and hemodynamically stable  Respiratory status: acceptable and room air  Hydration status: euvolemic  Multimodal analgesia pain management approach  Pain management: adequate and satisfactory to patient    No notable events documented.

## 2024-11-19 NOTE — DISCHARGE INSTRUCTIONS
Jame Samuel  [id number]  1965    COLONOSCOPY DISCHARGE INSTRUCTIONS    DISCOMFORT:  Redness at IV site- apply warm compress to area; if redness or soreness persist- contact your physician  There may be a slight amount of blood passed from the rectum  Gaseous discomfort- walking, belching will help relieve any discomfort    DIET:   Regular diet, however, remember your colon is empty and a heavy meal will produce gas.  Avoid these foods:  vegetables, fried / greasy foods, carbonated drinks for today  You may not drink alcoholic beverages for at least 12 hours       ACTIVITY:  You may not operate a vehicle for 12 hours  You may not engage in an occupation involving machinery or appliances for rest of today  You may then resume your normal daily activities it is recommended that you spend the remainder of the day resting -  avoid any strenuous activity.  Avoid making any critical decisions for at least 24 hour    CALL M.D.  ANY SIGN OF:   Increasing pain, nausea, vomiting  Abdominal distension (swelling)  New increased bleeding (oral or rectal)  Fever (chills)  Pain in chest area  Bloody discharge from nose or mouth  Shortness of breath     Follow-up Instructions:   Call Dr. Whyte for any questions or concerns.   Telephone # [phone]  Biopsy results will be available in  5 to 7 days      Impression:  normal colon repeat in 5 years

## 2024-11-19 NOTE — H&P
Jame Baker (:  1965) is a 59 y.o. male, Established patient, here for evaluation of the following chief complaint(s):  Annual Exam (Need UDS- last one done in 2023/Controlled Substance agreement. Patient asking for a FLU and Tdap shot- TDAP provided in  and is on file)      ASSESSMENT/PLAN:  1. Physical exam  -     CBC; Future  -     Comprehensive Metabolic Panel; Future  -     Lipid Panel; Future  -     TSH; Future  -     PSA Screening; Future  Complete physical done today. Routine lab work ordered. Waiting for results.    2. Attention deficit hyperactivity disorder (ADHD), unspecified ADHD type  -     Compliance Drug Analysis, Urine; Future  The pt is prescribed Adderal he will need to give a urine test for compliance.   He will also sign controlled substance agreement form.     3. Excoriated acne  -     clindamycin 1 % gel; APPLY A THIN FILM TOPICALLY TO AFFECTED AREA(S) TWO TIMES DAILY, Disp-30 g, R-1, Normal  -     hydrocortisone 2.5 % cream; Apply topically 2 times daily, Topical, 2 TIMES DAILY Starting 2024, Disp-1 each, R-2, Normal sent to pharmacy.   I refilled the pt clindagel and hydrocortisone 2.5 % cream    4. Encounter for immunization  -     Influenza, FLUCELVAX Trivalent, (age 6 mo+) IM, Preservative Free, 0.5mL  Flu shot administered in office per pt request.    5. Colon cancer screening  -     [facility] - Xochitl Whyte MD, Colorectal Surgery, [place] ([address])  I referred the pt to Dr. Xochitl Whyte (colorectal surgery) to scheduled colon cancer screening.      Subjective   SUBJECTIVE/OBJECTIVE:  HPI    Pt presents today for a physical. Pt is fasting today.    Pt is compliant in taking Adderall 20 mg BID and compliant in taking Clindagel 1%     Pt is going to get colonoscopy.    Pt does have allergies he states he takes Claritin. Pt states he has heartburn when eating pepperoni pizza.  Needs acne medications refill.           Patient Active Problem List   Diagnosis    Insomnia

## 2024-11-19 NOTE — PROGRESS NOTES
Verified patient name and date of birth, scheduled procedure, and informed consent. Reviewed general discharge instructions and  information.  Assessed patient. Awake, alert, and oriented per baseline. Vital signs stable (see vital sign flowsheet). Respiratory status within defined limits, abdomen soft and non tender. Skin with in defined limits.     Initial RN admission and assessment performed and documented in Endoscopy navigator.     Patient evaluated by anesthesia in pre-procedure holding.     All procedural vital signs, airway assessment, and level of consciousness information monitored and recorded by anesthesia staff on the anesthesia record.     Report received from CRNA post procedure.  Patient transported to recovery area by RN.    Endoscopy post procedure time out was performed and specimens were verified with physician.    Endoscope was pre-cleaned at bedside immediately following procedure by Kyrie.

## 2025-05-28 ENCOUNTER — TELEPHONE (OUTPATIENT)
Dept: PRIMARY CARE CLINIC | Facility: CLINIC | Age: 60
End: 2025-05-28

## 2025-05-28 NOTE — TELEPHONE ENCOUNTER
Spoke to pt and he wants an estevan for rib pain after fall for Monday at 3pm. I told him I can do 2:30pm. He said ok.

## 2025-05-28 NOTE — TELEPHONE ENCOUNTER
Appointment Request From: Jame Baker     With Provider: Dr. Megan Macias MD [[facility]]     Preferred Date Range: 6/2/2025 - 6/2/2025     Preferred Times: Monday Afternoon     Reason for visit: Request an Appointment     Health Maintenance Topic:      Comments:  Request 6/2/25 @ 3:00 pmRib cage pain after fall

## 2025-07-30 ENCOUNTER — OFFICE VISIT (OUTPATIENT)
Dept: PRIMARY CARE CLINIC | Facility: CLINIC | Age: 60
End: 2025-07-30
Payer: COMMERCIAL

## 2025-07-30 VITALS
OXYGEN SATURATION: 98 % | RESPIRATION RATE: 16 BRPM | DIASTOLIC BLOOD PRESSURE: 62 MMHG | HEART RATE: 55 BPM | WEIGHT: 192 LBS | BODY MASS INDEX: 26.01 KG/M2 | HEIGHT: 72 IN | SYSTOLIC BLOOD PRESSURE: 105 MMHG

## 2025-07-30 DIAGNOSIS — F90.9 ATTENTION DEFICIT HYPERACTIVITY DISORDER (ADHD), UNSPECIFIED ADHD TYPE: ICD-10-CM

## 2025-07-30 DIAGNOSIS — L70.5 EXCORIATED ACNE: ICD-10-CM

## 2025-07-30 DIAGNOSIS — R07.89 LEFT-SIDED CHEST WALL PAIN: Primary | ICD-10-CM

## 2025-07-30 DIAGNOSIS — R29.6 MULTIPLE FALLS: ICD-10-CM

## 2025-07-30 PROCEDURE — 99214 OFFICE O/P EST MOD 30 MIN: CPT | Performed by: INTERNAL MEDICINE

## 2025-07-30 RX ORDER — HYDROCORTISONE 25 MG/G
CREAM TOPICAL
Qty: 29 G | Refills: 1 | Status: SHIPPED | OUTPATIENT
Start: 2025-07-30

## 2025-07-30 RX ORDER — CLINDAMYCIN PHOSPHATE 11.9 MG/ML
SOLUTION TOPICAL
Qty: 30 ML | Refills: 1 | Status: SHIPPED | OUTPATIENT
Start: 2025-07-30 | End: 2025-08-29

## 2025-07-30 RX ORDER — DEXTROAMPHETAMINE SACCHARATE, AMPHETAMINE ASPARTATE MONOHYDRATE, DEXTROAMPHETAMINE SULFATE AND AMPHETAMINE SULFATE 5; 5; 5; 5 MG/1; MG/1; MG/1; MG/1
20 CAPSULE, EXTENDED RELEASE ORAL 2 TIMES DAILY
Qty: 60 CAPSULE | Refills: 0 | Status: CANCELLED | OUTPATIENT
Start: 2025-07-30 | End: 2025-08-29

## 2025-07-30 RX ORDER — HYDROCORTISONE 25 MG/G
CREAM TOPICAL 2 TIMES DAILY
Qty: 1 EACH | Refills: 2 | Status: CANCELLED | OUTPATIENT
Start: 2025-07-30

## 2025-07-30 RX ORDER — DEXTROAMPHETAMINE SACCHARATE, AMPHETAMINE ASPARTATE, DEXTROAMPHETAMINE SULFATE AND AMPHETAMINE SULFATE 3.75; 3.75; 3.75; 3.75 MG/1; MG/1; MG/1; MG/1
15 TABLET ORAL 2 TIMES DAILY
Qty: 60 TABLET | Refills: 0 | Status: SHIPPED | OUTPATIENT
Start: 2025-07-30 | End: 2025-08-29

## 2025-07-30 SDOH — ECONOMIC STABILITY: FOOD INSECURITY: WITHIN THE PAST 12 MONTHS, THE FOOD YOU BOUGHT JUST DIDN'T LAST AND YOU DIDN'T HAVE MONEY TO GET MORE.: PATIENT DECLINED

## 2025-07-30 SDOH — ECONOMIC STABILITY: FOOD INSECURITY: WITHIN THE PAST 12 MONTHS, YOU WORRIED THAT YOUR FOOD WOULD RUN OUT BEFORE YOU GOT MONEY TO BUY MORE.: PATIENT DECLINED

## 2025-07-30 ASSESSMENT — ENCOUNTER SYMPTOMS
EYE DISCHARGE: 0
SORE THROAT: 0
SHORTNESS OF BREATH: 0
ABDOMINAL PAIN: 0
CONSTIPATION: 0
COLOR CHANGE: 0
DIARRHEA: 0
RHINORRHEA: 0
BACK PAIN: 0
COUGH: 0
CHEST TIGHTNESS: 0

## 2025-07-30 ASSESSMENT — PATIENT HEALTH QUESTIONNAIRE - PHQ9
2. FEELING DOWN, DEPRESSED OR HOPELESS: NOT AT ALL
SUM OF ALL RESPONSES TO PHQ QUESTIONS 1-9: 0
SUM OF ALL RESPONSES TO PHQ QUESTIONS 1-9: 0
1. LITTLE INTEREST OR PLEASURE IN DOING THINGS: NOT AT ALL
SUM OF ALL RESPONSES TO PHQ QUESTIONS 1-9: 0
SUM OF ALL RESPONSES TO PHQ QUESTIONS 1-9: 0

## 2025-07-30 NOTE — PROGRESS NOTES
Health Decision Maker has been checked with the patient - declined    Patient has stated that the scribe can come in room    Chief Complaint   Patient presents with    Rib Injury     May 24 fell, [facility] ribs. [facility]. Still hurting     Fall       \"Have you been to the ER, urgent care clinic since your last visit?  Hospitalized since your last visit?\"    YES - When: approximately 2 months ago.  Where and Why[facility]    “Have you seen or consulted any other health care providers outside of [facility] since your last visit?”    NO      Vitals:    07/30/25 1510   BP: 105/62   BP Site: Left Upper Arm   Patient Position: Sitting   BP Cuff Size: Small Adult   Pulse: 55   Resp: 16   SpO2: 98%   Weight: 87.1 kg (192 lb)   Height: 1.829 m (6')      Depression: Not at risk (7/30/2025)    PHQ-2     PHQ-2 Score: 0              Click Here for Release of Records Request    Chart reviewed: immunizations are documented.   Immunization History   Administered Date(s) Administered    COVID-19, PFIZER Bivalent, DO NOT Dilute, (age 12y+), IM, 30 mcg/0.3 mL 12/09/2022    COVID-19, PFIZER PURPLE top, DILUTE for use, (age 12 y+), 30mcg/0.3mL 04/14/2021, 05/11/2021, 12/21/2021, 08/24/2022    Influenza A ([id number]),all Formulations 04/08/2010    Influenza Quadv 10/09/2020    Influenza Trivalent 10/21/2013, 11/10/2014, 11/09/2016    Influenza Virus Vaccine 09/28/2009, 11/24/2010, 10/08/2018, 10/09/2020, 10/15/2021, 09/19/2022, 10/10/2023    Influenza, AFLURIA, FLUZONE, (age2 y+), IM, Trivalent MDV, 0.5mL 11/09/2016    Influenza, FLUARIX, FLULAVAL, FLUZONE (age 6 mo+) and AFLURIA, (age 3 y+), Quadv PF, 0.5mL 09/19/2022    Influenza, FLUCELVAX, (age 6 mo+) IM, Trivalent PF, 0.5mL 09/17/2024    Influenza, FLUCELVAX, (age 6 mo+), MDCK, Quadv PF, 0.5mL 10/24/2018, 10/22/2019    Influenza, Intradermal, Preservative free 11/04/2015    TDaP, ADACEL (age 10y-64y), BOOSTRIX (age 10y+), IM,

## 2025-07-30 NOTE — PROGRESS NOTES
Jame Baker (:  1965) is a 60 y.o. male, Established patient, here for evaluation of the following chief complaint(s):  Rib Injury (May 24 fell, hit ribs. [facility], [facility] was clear. Still hurting ) and Fall        ASSESSMENT/PLAN:  1. Left-sided chest wall pain  -     External Referral To Physical Therapy  I provided the pt with a referral to physical therapy to re-establish care with a provider of his choosing.  2. Multiple falls  -     External Referral To Physical Therapy  Pt reports no known problems with his balance or leg strength, despite 2 recent falls. I provided the pt with a referral to physical therapy to establish care with a provider of his choosing.  3. Excoriated acne  -     clindamycin (CLEOCIN T) 1 % external solution; Apply topically 2 times daily., Disp-30 mL, R-1, Normal sent to pharmacy.  -     hydrocortisone 2.5 % cream; Apply topically 2 times daily., Disp-29 g, R-1, Normal sent to pharmacy.  I refilled both clindamycin 1% solution and hydrocortisone 2.5% cream to continue applying BID.  4. Attention deficit hyperactivity disorder (ADHD), unspecified ADHD type  -     Compliance Drug Analysis, Urine; Future  -     amphetamine-dextroamphetamine (ADDERALL, 15MG,) 15 MG tablet; Take 1 tablet by mouth 2 times daily for 30 days. Max Daily Amount: 30 mg, Disp-60 tablet, R-0Normal sent to pharmacy.  Pt's last completed UDS for drug compliance on-file is from 2023. I informed the pt that he will need to complete an updated UDS, which was ordered today. PDMP was also reviewed today.   Pt requests an IR formulation of Adderall today. As an IR formulation of Adderall 20 mg is not available, I decreased his dosage to 15 mg IR to continue taking up to BID. He should pick it up after completing the compliance UDS.            Subjective   SUBJECTIVE/OBJECTIVE:  Fall  Pertinent negatives include no abdominal pain or headaches.       Patient presents today for fall and rib

## 2025-08-03 LAB — DRUGS UR: NORMAL

## 2025-08-26 DIAGNOSIS — F90.9 ATTENTION DEFICIT HYPERACTIVITY DISORDER (ADHD), UNSPECIFIED ADHD TYPE: ICD-10-CM

## 2025-08-28 RX ORDER — DEXTROAMPHETAMINE SACCHARATE, AMPHETAMINE ASPARTATE, DEXTROAMPHETAMINE SULFATE AND AMPHETAMINE SULFATE 3.75; 3.75; 3.75; 3.75 MG/1; MG/1; MG/1; MG/1
15 TABLET ORAL 2 TIMES DAILY
Qty: 60 TABLET | Refills: 0 | Status: SHIPPED | OUTPATIENT
Start: 2025-08-28 | End: 2025-09-27

## (undated) DEVICE — SUPPLEMENT DIGESTIVE H2O SOL GI-EASE